# Patient Record
Sex: FEMALE | Race: WHITE | Employment: FULL TIME | URBAN - METROPOLITAN AREA
[De-identification: names, ages, dates, MRNs, and addresses within clinical notes are randomized per-mention and may not be internally consistent; named-entity substitution may affect disease eponyms.]

---

## 2017-01-11 DIAGNOSIS — E03.4 HYPOTHYROIDISM DUE TO ACQUIRED ATROPHY OF THYROID: ICD-10-CM

## 2017-01-12 ENCOUNTER — OFFICE VISIT (OUTPATIENT)
Dept: FAMILY MEDICINE CLINIC | Age: 47
End: 2017-01-12

## 2017-01-12 VITALS
RESPIRATION RATE: 20 BRPM | SYSTOLIC BLOOD PRESSURE: 128 MMHG | HEIGHT: 65 IN | WEIGHT: 231.6 LBS | DIASTOLIC BLOOD PRESSURE: 94 MMHG | TEMPERATURE: 97.5 F | OXYGEN SATURATION: 97 % | BODY MASS INDEX: 38.59 KG/M2 | HEART RATE: 92 BPM

## 2017-01-12 DIAGNOSIS — E78.00 PURE HYPERCHOLESTEROLEMIA: ICD-10-CM

## 2017-01-12 DIAGNOSIS — E03.4 HYPOTHYROIDISM DUE TO ACQUIRED ATROPHY OF THYROID: ICD-10-CM

## 2017-01-12 DIAGNOSIS — E55.9 HYPOVITAMINOSIS D: ICD-10-CM

## 2017-01-12 DIAGNOSIS — R07.89 OTHER CHEST PAIN: Primary | ICD-10-CM

## 2017-01-12 DIAGNOSIS — I10 ESSENTIAL HYPERTENSION: ICD-10-CM

## 2017-01-12 RX ORDER — LISINOPRIL AND HYDROCHLOROTHIAZIDE 20; 25 MG/1; MG/1
1 TABLET ORAL DAILY
Qty: 90 TAB | Refills: 1 | Status: SHIPPED | OUTPATIENT
Start: 2017-01-12 | End: 2017-08-21 | Stop reason: SDUPTHER

## 2017-01-12 NOTE — PROGRESS NOTES
Jerry Horne is a 48 y.o. female  Chief Complaint   Patient presents with    Cholesterol Problem     6 weeks follow up   1. Have you been to the ER, urgent care clinic since your last visit? Hospitalized since your last visit? No    2. Have you seen or consulted any other health care providers outside of the [facility] since your last visit? Include any pap smears or colon screening.  No

## 2017-01-12 NOTE — LETTER
NOTIFICATION RETURN TO WORK / SCHOOL 
 
1/12/2017 4:21 PM 
 
Ms. Servin [id number] [facility] [place] [address] To Whom It May Concern: 
 
Duc Shelby is currently under the care of [address] [place] [address]. She was seen in our office on Thursday 1/12/17. If there are questions or concerns please have the patient contact our office. Sincerely, Camille Villalta MD

## 2017-01-12 NOTE — MR AVS SNAPSHOT
Visit Information Date & Time Provider Department Dept. Phone Encounter #  
 1/12/2017  3:15 PM Connierobert aRfaelaMau [facility] [phone] [id number] Upcoming Health Maintenance Date Due  
 BREAST CANCER SCRN MAMMOGRAM 6/2/2018 DTaP/Tdap/Td series (2 - Td) 7/31/2025 Allergies as of 1/12/2017  Review Complete On: 1/12/2017 By: Tuan Russo MD  
 No Known Allergies Current Immunizations  Reviewed on 9/8/2016 Name Date Influenza Vaccine 10/15/2015 Influenza Vaccine (Quad) PF 9/8/2016 Influenza Vaccine Split 10/18/2012 Tdap 7/31/2015 Not reviewed this visit You Were Diagnosed With   
  
 Codes Comments Other chest pain    -  Primary ICD-10-CM: R07.89 ICD-9-CM: 786.59 Pure hypercholesterolemia     ICD-10-CM: E78.00 ICD-9-CM: 272.0 Essential hypertension     ICD-10-CM: I10 
ICD-9-CM: 401.9 Hypovitaminosis D     ICD-10-CM: E55.9 ICD-9-CM: 268.9 Hypothyroidism due to acquired atrophy of thyroid     ICD-10-CM: E03.4 ICD-9-CM: 244.8, 246.8 Vitals BP Pulse Temp Resp Height(growth percentile) Weight(growth percentile) (!) 128/94 (BP 1 Location: Right arm, BP Patient Position: Sitting) 92 97.5 °F (36.4 °C) (Oral) 20 5' 5\" (1.651 m) 231 lb 9.6 oz (105.1 kg) SpO2 BMI OB Status Smoking Status 97% 38.54 kg/m2 Hysterectomy Never Smoker BMI and BSA Data Body Mass Index Body Surface Area 38.54 kg/m 2 2.2 m 2 Preferred Pharmacy Pharmacy Name Phone [facility] [phone] [facility] [phone] Your Updated Medication List  
  
   
This list is accurate as of: 1/12/17  4:16 PM.  Always use your most recent med list.  
  
  
  
  
 esomeprazole 40 mg capsule Commonly known as:  NexIUM Take 1 Cap by mouth daily. levothyroxine 112 mcg tablet Commonly known as:  SYNTHROID  
 Take 1 Tab by mouth Daily (before breakfast). Indications: hypothyroidism  
  
 lisinopril-hydroCHLOROthiazide 20-25 mg per tablet Commonly known as:  Edilberto Lima Take 1 Tab by mouth daily. promethazine 25 mg tablet Commonly known as:  PHENERGAN Take 1 Tab by mouth every eight (8) hours as needed for Nausea. simvastatin 10 mg tablet Commonly known as:  ZOCOR Take 1 Tab by mouth nightly. VITAMIN D3 1,000 unit Cap Generic drug:  cholecalciferol Take  by mouth daily. Prescriptions Sent to Pharmacy Refills  
 lisinopril-hydroCHLOROthiazide (PRINZIDE, ZESTORETIC) 20-25 mg per tablet 1 Sig: Take 1 Tab by mouth daily. Class: Normal  
 Pharmacy: [address] [place] [address] #: [phone] Route: Oral  
  
We Performed the Following AMB POC EKG ROUTINE W/ 12 LEADS, INTER & REP [60622 CPT(R)] To-Do List   
 01/12/2017 Lab:  HEPATIC FUNCTION PANEL   
  
 01/12/2017 Lab:  LIPID PANEL   
  
 01/12/2017 ECG:  STRESS TEST CARDIAC   
  
 01/12/2017 Lab:  T4, FREE   
  
 01/12/2017 Lab:  TSH 3RD GENERATION   
  
 01/12/2017 Lab:  VITAMIN D, 25 HYDROXY Referral Information Referral ID Referred By Referred To  
  
 [id number] Chuck DOSHI Not Available Visits Status Start Date End Date 1 New Request 1/12/17 1/12/18 If your referral has a status of pending review or denied, additional information will be sent to support the outcome of this decision. Patient Instructions Chest Pain: Care Instructions Your Care Instructions There are many things that can cause chest pain. Some are not serious and will get better on their own in a few days. But some kinds of chest pain need more testing and treatment. Your doctor may have recommended a follow-up visit in the next 8 to 12 hours. If you are not getting better, you may need more tests or treatment. Even though your doctor has released you, you still need to watch for any problems. The doctor carefully checked you, but sometimes problems can develop later. If you have new symptoms or if your symptoms do not get better, get medical care right away. If you have worse or different chest pain or pressure that lasts more than 5 minutes or you passed out (lost consciousness), call 911 or seek other emergency help right away. A medical visit is only one step in your treatment. Even if you feel better, you still need to do what your doctor recommends, such as going to all suggested follow-up appointments and taking medicines exactly as directed. This will help you recover and help prevent future problems. How can you care for yourself at home? · Rest until you feel better. · Take your medicine exactly as prescribed. Call your doctor if you think you are having a problem with your medicine. · Do not drive after taking a prescription pain medicine. When should you call for help? Call 911 if: 
· You passed out (lost consciousness). · You have severe difficulty breathing. · You have symptoms of a heart attack. These may include: ¨ Chest pain or pressure, or a strange feeling in your chest. 
¨ Sweating. ¨ Shortness of breath. ¨ Nausea or vomiting. ¨ Pain, pressure, or a strange feeling in your back, neck, jaw, or upper belly or in one or both shoulders or arms. ¨ Lightheadedness or sudden weakness. ¨ A fast or irregular heartbeat. After you call 911, the  may tell you to chew 1 adult-strength or 2 to 4 low-dose aspirin. Wait for an ambulance. Do not try to drive yourself. Call your doctor today if: 
· You have any trouble breathing. · Your chest pain gets worse. · You are dizzy or lightheaded, or you feel like you may faint. · You are not getting better as expected. · You are having new or different chest pain. Where can you learn more? Go to http://yoel-osvaldo.info/. Enter A120 in the search box to learn more about \"Chest Pain: Care Instructions. \" Current as of: May 27, 2016 Content Version: 11.1 © 3477-7395 [employer]. Care instructions adapted under license by [employer] (which disclaims liability or warranty for this information). If you have questions about a medical condition or this instruction, always ask your healthcare professional. [address] any warranty or liability for your use of this information. Introducing [facility] & HEALTH SERVICES! Dear Delphia Habermann: Thank you for requesting a Medocity account. Our records indicate that you already have an active Medocity account. You can access your account anytime at [email or web address] Did you know that you can access your hospital and ER discharge instructions at any time in Medocity? You can also review all of your test results from your hospital stay or ER visit. Additional Information If you have questions, please visit the Frequently Asked Questions section of the Medocity website at [email or web address] Remember, Medocity is NOT to be used for urgent needs. For medical emergencies, dial 911. Now available from your iPhone and Android! Please provide this summary of care documentation to your next provider. Your primary care clinician is listed as Fredy Wallace. If you have any questions after today's visit, please call [phone].

## 2017-01-12 NOTE — PROGRESS NOTES
Assessment/Plan:    David De Leon was seen today for cholesterol problem. Diagnoses and all orders for this visit:    Other chest pain  -     AMB POC EKG ROUTINE W/ 12 LEADS, INTER & REP  -     STRESS TEST CARDIAC; Future    Pure hypercholesterolemia  -     LIPID PANEL; Future  -     HEPATIC FUNCTION PANEL; Future    Essential hypertension  -     lisinopril-hydroCHLOROthiazide (PRINZIDE, ZESTORETIC) 20-25 mg per tablet; Take 1 Tab by mouth daily. Hypovitaminosis D  -     VITAMIN D, 25 HYDROXY; Future    Hypothyroidism due to acquired atrophy of thyroid  -     TSH 3RD GENERATION; Future  -     T4, FREE; Future      Will await the stress test results. Will otherwise see her for f/u in 6 weeks for HTN. Will change med to [address] The plan was discussed with the patient. The patient verbalized understanding and is in agreement with the plan. All medication potential side effects were discussed with the patient.    -------------------------------------------------------------------------------------------------------------------        Calli Arriaga is a 48 y.o. female and presents with Cholesterol Problem (6 weeks follow up)         Subjective:  Pt here for f/u. Has been noting chest pain LT side, radiating into LT arm. Seems to occur at times of more stress. Has fam hx of her mother who had quadruple bypass in her late forties, so this worries pt. Pt has risk factors for CAD with the HTN and HLD.    HLD; we increased Zocor to 10 mg in Nov.  Tolerating it well. HTN: not so well controlled. ROS:  Constitutional: No recent weight change. No weakness/fatigue. No f/c. Skin: No rashes, change in nails/hair, itching   HENT: No HA, dizziness. No hearing loss/tinnitus. No nasal congestion/discharge. Eyes: No change in vision, double/blurred vision or eye pain/redness. Cardiovascular: No CP/palpitations. No GRANDE/orthopnea/PND. Respiratory: No cough/sputum, dyspnea, wheezing.    Gastointestinal: No dysphagia, reflux. No n/v. No constipation/diarrhea. No melena/rectal bleeding. Genitourinary: No dysuria, urinary hesitancy, nocturia, hematuria. No incontinence. Musculoskeletal: No joint pain/stiffness. No muscle pain/tenderness. Endo: No heat/cold intolerance, no polyuria/polydypsia. Heme: No h/o anemia. No easy bleeding/bruising. Allergy/Immunology: No seasonal rhinitis. Denies frequent colds, sinus/ear infections. Neurological: No seizures/numbness/weakness. No paresthesias. Psychiatric:  No depression, anxiety. The problem list was updated as a part of today's visit. Patient Active Problem List   Diagnosis Code    Hashimoto's thyroiditis E06.3    Hyperlipidemia E78.5    Gastroparesis K31.84    GERD (gastroesophageal reflux disease) K21.9    Fibromyalgia M79.7    Hiatal hernia K44.9    Hypothyroidism due to acquired atrophy of thyroid E03.4    Essential hypertension I10    Hypovitaminosis D E55.9       The PSH, FH were reviewed. SH:  Social History   Substance Use Topics    Smoking status: Never Smoker    Smokeless tobacco: Never Used    Alcohol use No       Medications/Allergies:  Current Outpatient Prescriptions on File Prior to Visit   Medication Sig Dispense Refill    simvastatin (ZOCOR) 10 mg tablet Take 1 Tab by mouth nightly. 90 Tab 1    levothyroxine (SYNTHROID) 112 mcg tablet Take 1 Tab by mouth Daily (before breakfast). Indications: hypothyroidism 90 Tab 1    cholecalciferol (VITAMIN D3) 1,000 unit cap Take  by mouth daily.  esomeprazole (NEXIUM) 40 mg capsule Take 1 Cap by mouth daily. 90 Cap 0    promethazine (PHENERGAN) 25 mg tablet Take 1 Tab by mouth every eight (8) hours as needed for Nausea. 60 Tab 0     No current facility-administered medications on file prior to visit.          No Known Allergies      Health Maintenance:   Health Maintenance   Topic Date Due    BREAST CANCER SCRN MAMMOGRAM  06/02/2018    DTaP/Tdap/Td series (2 - Td) 07/31/2025    INFLUENZA AGE 9 TO ADULT  Completed       Objective:  Visit Vitals    BP (!) 128/94 (BP 1 Location: Right arm, BP Patient Position: Sitting)    Pulse 92    Temp 97.5 °F (36.4 °C) (Oral)    Resp 20    Ht 5' 5\" (1.651 m)    Wt 231 lb 9.6 oz (105.1 kg)    SpO2 97%    BMI 38.54 kg/m2          Nurses notes and VS reviewed. Physical Examination: General appearance - alert, well appearing, and in no distress  Chest - clear to auscultation, no wheezes, rales or rhonchi, symmetric air entry  Heart - normal rate, regular rhythm, normal S1, S2, no murmurs, rubs, clicks or gallops        Labwork and Ancillary Studies:    CBC w/Diff  Lab Results   Component Value Date/Time    WBC 9.1 09/08/2016 08:18 AM    HGB 14.6 09/08/2016 08:18 AM    PLATELET 533 17/17/7152 08:18 AM         Basic Metabolic Profile  Lab Results   Component Value Date/Time    Sodium 142 09/08/2016 08:18 AM    Potassium 4.7 09/08/2016 08:18 AM    Chloride 99 09/08/2016 08:18 AM    CO2 27 09/08/2016 08:18 AM    Glucose 104 09/08/2016 08:18 AM    BUN 11 09/08/2016 08:18 AM    Creatinine 0.97 09/08/2016 08:18 AM    BUN/Creatinine ratio 11 09/08/2016 08:18 AM    GFR est AA 80 09/08/2016 08:18 AM    GFR est non-AA 69 09/08/2016 08:18 AM    Calcium 9.7 09/08/2016 08:18 AM         LFT  Lab Results   Component Value Date/Time    ALT 25 09/08/2016 08:18 AM    AST 21 09/08/2016 08:18 AM    Alk.  phosphatase 98 09/08/2016 08:18 AM    Bilirubin, direct 0.05 09/08/2016 08:18 AM    Bilirubin, total 0.2 09/08/2016 08:18 AM         Cholesterol  Lab Results   Component Value Date/Time    Cholesterol, total 205 11/09/2016 08:57 AM    HDL Cholesterol 54 11/09/2016 08:57 AM    LDL, calculated 128 11/09/2016 08:57 AM    Triglyceride 115 11/09/2016 08:57 AM

## 2017-02-03 ENCOUNTER — TELEPHONE (OUTPATIENT)
Dept: FAMILY MEDICINE CLINIC | Age: 47
End: 2017-02-03

## 2017-02-22 DIAGNOSIS — R07.89 OTHER CHEST PAIN: ICD-10-CM

## 2017-02-28 ENCOUNTER — TELEPHONE (OUTPATIENT)
Dept: FAMILY MEDICINE CLINIC | Age: 47
End: 2017-02-28

## 2017-02-28 NOTE — TELEPHONE ENCOUNTER
Pt called; she has not been taking the Zocor medication bc it was making her legs cramp up and made her feel \"yucky\"; pt was clocking in at work and had to hang up phone; please advise

## 2017-02-28 NOTE — TELEPHONE ENCOUNTER
We will try something to see if she can tolerate the Zocor. I want her to get CoQ10 100 - 150 mg dose and take once daily for a week. Then add the Zocor 10 mg back and take the 2 of them together at bedtime. Then monitor the cramping issue to see it it returns. It is important that she follow the above exactly this way. She has an appt in 1 month. If it goes well, then we will discuss it further on her visit.

## 2017-03-02 ENCOUNTER — OFFICE VISIT (OUTPATIENT)
Dept: FAMILY MEDICINE CLINIC | Age: 47
End: 2017-03-02

## 2017-03-02 VITALS
TEMPERATURE: 98.1 F | OXYGEN SATURATION: 96 % | WEIGHT: 230 LBS | BODY MASS INDEX: 38.32 KG/M2 | RESPIRATION RATE: 20 BRPM | DIASTOLIC BLOOD PRESSURE: 80 MMHG | SYSTOLIC BLOOD PRESSURE: 124 MMHG | HEART RATE: 75 BPM | HEIGHT: 65 IN

## 2017-03-02 DIAGNOSIS — R10.11 RUQ PAIN: ICD-10-CM

## 2017-03-02 DIAGNOSIS — R10.84 GENERALIZED ABDOMINAL PAIN: Primary | ICD-10-CM

## 2017-03-02 DIAGNOSIS — K80.20 CALCULUS OF GALLBLADDER WITHOUT CHOLECYSTITIS WITHOUT OBSTRUCTION: ICD-10-CM

## 2017-03-02 DIAGNOSIS — K59.01 SLOW TRANSIT CONSTIPATION: ICD-10-CM

## 2017-03-02 RX ORDER — HYDROCODONE BITARTRATE AND ACETAMINOPHEN 5; 325 MG/1; MG/1
1 TABLET ORAL
Qty: 30 TAB | Refills: 0 | Status: SHIPPED | OUTPATIENT
Start: 2017-03-02 | End: 2017-03-20

## 2017-03-02 NOTE — PROGRESS NOTES
Chief Complaint   Patient presents with    Abdominal Pain     pain scale 4/10    Diarrhea     1. Have you been to the ER, urgent care clinic since your last visit? Hospitalized since your last visit? No    2. Have you seen or consulted any other health care providers outside of the [address] [place] [address] since your last visit? Include any pap smears or colon screening.  No

## 2017-03-02 NOTE — MR AVS SNAPSHOT
Visit Information Date & Time Provider Department Dept. Phone Encounter #  
 3/2/2017  9:00 AM Robbie Metzger, 3 [facility] [phone] [id number] Your Appointments 3/23/2017 10:30 AM  
Follow Up with Robbie Metzger MD  
3 [facility]) Appt Note: 6 week f/u; pt r/s from 02/23/2017; r/s from 3/01  
 [address] [facility] [address] [facility] [id number] [phone]  
  
   
 [address] [place] [address] Upcoming Health Maintenance Date Due  
 BREAST CANCER SCRN MAMMOGRAM 6/2/2018 DTaP/Tdap/Td series (2 - Td) 7/31/2025 Allergies as of 3/2/2017  Review Complete On: 3/2/2017 By: Robbie Metzger MD  
 No Known Allergies Current Immunizations  Reviewed on 9/8/2016 Name Date Influenza Vaccine 10/15/2015 Influenza Vaccine (Quad) PF 9/8/2016 Influenza Vaccine Split 10/18/2012 Tdap 7/31/2015 Not reviewed this visit You Were Diagnosed With   
  
 Codes Comments Generalized abdominal pain    -  Primary ICD-10-CM: R10.84 ICD-9-CM: 789.07 Slow transit constipation     ICD-10-CM: K59.01 
ICD-9-CM: 564.01 Vitals BP  
  
  
  
  
  
 124/80 (BP 1 Location: Left arm, BP Patient Position: Sitting) Vitals History BMI and BSA Data Body Mass Index Body Surface Area  
 38.27 kg/m 2 2.19 m 2 Preferred Pharmacy Pharmacy Name Phone [facility] [address] [facility] [phone] Your Updated Medication List  
  
   
This list is accurate as of: 3/2/17  9:36 AM.  Always use your most recent med list.  
  
  
  
  
 esomeprazole 40 mg capsule Commonly known as:  NexIUM Take 1 Cap by mouth daily. HYDROcodone-acetaminophen 5-325 mg per tablet Commonly known as:  Becky Azar Take 1 Tab by mouth two (2) times daily as needed for Pain. Max Daily Amount: 2 Tabs. levothyroxine 112 mcg tablet Commonly known as:  SYNTHROID Take 1 Tab by mouth Daily (before breakfast). Indications: hypothyroidism  
  
 lisinopril-hydroCHLOROthiazide 20-25 mg per tablet Commonly known as:  Helyn Blinks Take 1 Tab by mouth daily. promethazine 25 mg tablet Commonly known as:  PHENERGAN Take 1 Tab by mouth every eight (8) hours as needed for Nausea. simvastatin 10 mg tablet Commonly known as:  ZOCOR Take 1 Tab by mouth nightly. VITAMIN D3 1,000 unit Cap Generic drug:  cholecalciferol Take  by mouth daily. Prescriptions Printed Refills HYDROcodone-acetaminophen (NORCO) 5-325 mg per tablet 0 Sig: Take 1 Tab by mouth two (2) times daily as needed for Pain. Max Daily Amount: 2 Tabs. Class: Print Route: Oral  
  
To-Do List   
 03/02/2017 Imaging:  XR ABD (AP AND ERECT OR DECUB) Patient Instructions Constipation: Care Instructions Your Care Instructions Constipation means that you have a hard time passing stools (bowel movements). People pass stools from 3 times a day to once every 3 days. What is normal for you may be different. Constipation may occur with pain in the rectum and cramping. The pain may get worse when you try to pass stools. Sometimes there are small amounts of bright red blood on toilet paper or the surface of stools. This is because of enlarged veins near the rectum (hemorrhoids). A few changes in your diet and lifestyle may help you avoid ongoing constipation. Your doctor may also prescribe medicine to help loosen your stool. Some medicines can cause constipation. These include pain medicines and antidepressants. Tell your doctor about all the medicines you take. Your doctor may want to make a medicine change to ease your symptoms. Follow-up care is a key part of your treatment and safety.  Be sure to make and go to all appointments, and call your doctor if you are having problems. It's also a good idea to know your test results and keep a list of the medicines you take. How can you care for yourself at home? · Drink plenty of fluids, enough so that your urine is light yellow or clear like water. If you have kidney, heart, or liver disease and have to limit fluids, talk with your doctor before you increase the amount of fluids you drink. · Include high-fiber foods in your diet each day. These include fruits, vegetables, beans, and whole grains. · Get at least 30 minutes of exercise on most days of the week. Walking is a good choice. You also may want to do other activities, such as running, swimming, cycling, or playing tennis or team sports. · Take a fiber supplement, such as Citrucel or Metamucil, every day. Read and follow all instructions on the label. · Schedule time each day for a bowel movement. A daily routine may help. Take your time having your bowel movement. · Support your feet with a small step stool when you sit on the toilet. This helps flex your hips and places your pelvis in a squatting position. · Your doctor may recommend an over-the-counter laxative to relieve your constipation. Examples are Milk of Magnesia and MiraLax. Read and follow all instructions on the label. Do not use laxatives on a long-term basis. When should you call for help? Call your doctor now or seek immediate medical care if: 
· You have new or worse belly pain. · You have new or worse nausea or vomiting. · You have blood in your stools. Watch closely for changes in your health, and be sure to contact your doctor if: 
· Your constipation is getting worse. · You do not get better as expected. Where can you learn more? Go to http://yoel-osvaldo.info/. Enter 21  in the search box to learn more about \"Constipation: Care Instructions. \" Current as of: May 27, 2016 Content Version: 11.1 © 1301-7476 [employer].  Care instructions adapted under license by [address] (which disclaims liability or warranty for this information). If you have questions about a medical condition or this instruction, always ask your healthcare professional. [address] any warranty or liability for your use of this information. Introducing [facility] HE[facility] SERVICES! Dear Sean Franz: Thank you for requesting a WiNetworks account. Our records indicate that you already have an active WiNetworks account. You can access your account anytime at [email or web address] Did you know that you can access your hospital and ER discharge instructions at any time in WiNetworks? You can also review all of your test results from your hospital stay or ER visit. Additional Information If you have questions, please visit the Frequently Asked Questions section of the WiNetworks website at [email or web address] Remember, WiNetworks is NOT to be used for urgent needs. For medical emergencies, dial 911. Now available from your iPhone and Android! Please provide this summary of care documentation to your next provider. Your primary care clinician is listed as Fredy 51. If you have any questions after today's visit, please call [phone].

## 2017-03-02 NOTE — LETTER
NOTIFICATION RETURN TO WORK / SCHOOL 
 
3/2/2017 9:41 AM 
 
Ms. [address] 
[address] [phone] To Whom It May Concern: 
 
Michael Polk is currently under the care of [address] [place] [address]. She was seen on 3/2/2017. If there are questions or concerns please have the patient contact our office. Sincerely, Black Aldridge MD

## 2017-03-02 NOTE — PROGRESS NOTES
Assessment/Plan:    Leidy Stanley was seen today for abdominal pain and diarrhea. Diagnoses and all orders for this visit:    Generalized abdominal pain  -     XR ABD (AP AND ERECT OR DECUB); Future  -     HYDROcodone-acetaminophen (NORCO) 5-325 mg per tablet; Take 1 Tab by mouth two (2) times daily as needed for Pain. Max Daily Amount: 2 Tabs. Slow transit constipation  -     XR ABD (AP AND ERECT OR DECUB); Future      Will await results. Cautioned her on Norco and how it can constipate her and make her drowsy. She will use 1/2 tab if possible. Will consider lactulose if still retaining much stool. The plan was discussed with the patient. The patient verbalized understanding and is in agreement with the plan. All medication potential side effects were discussed with the patient.    -------------------------------------------------------------------------------------------------------------------        Hoang Savage is a 48 y.o. female and presents with Abdominal Pain (pain scale 4/10) and Diarrhea         Subjective:  About 1 week ago, started with constipation. Was pushing fluids x 3 days with no results. Then tried a laxative but had mild results. Pain and pressure has been intense. Used laxative 3 days in a row, then had a reverse reaction of diarrhea yesterday into today. No fevers. ROS:  Constitutional: No recent weight change. No weakness/fatigue. No f/c. Skin: No rashes, change in nails/hair, itching   HENT: No HA, dizziness. No hearing loss/tinnitus. No nasal congestion/discharge. Eyes: No change in vision, double/blurred vision or eye pain/redness. Cardiovascular: No CP/palpitations. No GRANDE/orthopnea/PND. Respiratory: No cough/sputum, dyspnea, wheezing. Gastointestinal: No dysphagia, reflux. No n/v. No constipation/diarrhea. No melena/rectal bleeding. Genitourinary: No dysuria, urinary hesitancy, nocturia, hematuria. No incontinence.    Musculoskeletal: No joint pain/stiffness. No muscle pain/tenderness. Endo: No heat/cold intolerance, no polyuria/polydypsia. Heme: No h/o anemia. No easy bleeding/bruising. Allergy/Immunology: No seasonal rhinitis. Denies frequent colds, sinus/ear infections. Neurological: No seizures/numbness/weakness. No paresthesias. Psychiatric:  No depression, anxiety. The problem list was updated as a part of today's visit. Patient Active Problem List   Diagnosis Code    Hashimoto's thyroiditis E06.3    Hyperlipidemia E78.5    Gastroparesis K31.84    GERD (gastroesophageal reflux disease) K21.9    Fibromyalgia M79.7    Hiatal hernia K44.9    Hypothyroidism due to acquired atrophy of thyroid E03.4    Essential hypertension I10    Hypovitaminosis D E55.9       The PSH, FH were reviewed. SH:  Social History   Substance Use Topics    Smoking status: Never Smoker    Smokeless tobacco: Never Used    Alcohol use No       Medications/Allergies:  Current Outpatient Prescriptions on File Prior to Visit   Medication Sig Dispense Refill    lisinopril-hydroCHLOROthiazide (PRINZIDE, ZESTORETIC) 20-25 mg per tablet Take 1 Tab by mouth daily. 90 Tab 1    simvastatin (ZOCOR) 10 mg tablet Take 1 Tab by mouth nightly. 90 Tab 1    levothyroxine (SYNTHROID) 112 mcg tablet Take 1 Tab by mouth Daily (before breakfast). Indications: hypothyroidism 90 Tab 1    cholecalciferol (VITAMIN D3) 1,000 unit cap Take  by mouth daily.  esomeprazole (NEXIUM) 40 mg capsule Take 1 Cap by mouth daily. 90 Cap 0    promethazine (PHENERGAN) 25 mg tablet Take 1 Tab by mouth every eight (8) hours as needed for Nausea. 60 Tab 0     No current facility-administered medications on file prior to visit.          No Known Allergies      Health Maintenance:   Health Maintenance   Topic Date Due    BREAST CANCER SCRN MAMMOGRAM  06/02/2018    DTaP/Tdap/Td series (2 - Td) 07/31/2025    INFLUENZA AGE 9 TO ADULT  Completed       Objective:  Visit Vitals    /80 (BP 1 Location: Left arm, BP Patient Position: Sitting)    Pulse 75    Temp 98.1 °F (36.7 °C) (Oral)    Resp 20    Ht 5' 5\" (1.651 m)    Wt 230 lb (104.3 kg)    SpO2 96%    BMI 38.27 kg/m2          Nurses notes and VS reviewed. Physical Examination: General appearance - looks uncomfortable. Chest - clear to auscultation, no wheezes, rales or rhonchi, symmetric air entry  Heart - normal rate, regular rhythm, normal S1, S2, no murmurs, rubs, clicks or gallops  Abdomen - tenderness noted generalized. Labwork and Ancillary Studies:    CBC w/Diff  Lab Results   Component Value Date/Time    WBC 9.1 09/08/2016 08:18 AM    HGB 14.6 09/08/2016 08:18 AM    PLATELET 721 32/14/0183 08:18 AM         Basic Metabolic Profile  Lab Results   Component Value Date/Time    Sodium 142 09/08/2016 08:18 AM    Potassium 4.7 09/08/2016 08:18 AM    Chloride 99 09/08/2016 08:18 AM    CO2 27 09/08/2016 08:18 AM    Glucose 104 09/08/2016 08:18 AM    BUN 11 09/08/2016 08:18 AM    Creatinine 0.97 09/08/2016 08:18 AM    BUN/Creatinine ratio 11 09/08/2016 08:18 AM    GFR est AA 80 09/08/2016 08:18 AM    GFR est non-AA 69 09/08/2016 08:18 AM    Calcium 9.7 09/08/2016 08:18 AM         LFT  Lab Results   Component Value Date/Time    ALT (SGPT) 25 09/08/2016 08:18 AM    AST (SGOT) 21 09/08/2016 08:18 AM    Alk.  phosphatase 98 09/08/2016 08:18 AM    Bilirubin, direct 0.05 09/08/2016 08:18 AM    Bilirubin, total 0.2 09/08/2016 08:18 AM         Cholesterol  Lab Results   Component Value Date/Time    Cholesterol, total 205 11/09/2016 08:57 AM    HDL Cholesterol 54 11/09/2016 08:57 AM    LDL, calculated 128 11/09/2016 08:57 AM    Triglyceride 115 11/09/2016 08:57 AM

## 2017-03-02 NOTE — PATIENT INSTRUCTIONS
Constipation: Care Instructions  Your Care Instructions  Constipation means that you have a hard time passing stools (bowel movements). People pass stools from 3 times a day to once every 3 days. What is normal for you may be different. Constipation may occur with pain in the rectum and cramping. The pain may get worse when you try to pass stools. Sometimes there are small amounts of bright red blood on toilet paper or the surface of stools. This is because of enlarged veins near the rectum (hemorrhoids). A few changes in your diet and lifestyle may help you avoid ongoing constipation. Your doctor may also prescribe medicine to help loosen your stool. Some medicines can cause constipation. These include pain medicines and antidepressants. Tell your doctor about all the medicines you take. Your doctor may want to make a medicine change to ease your symptoms. Follow-up care is a key part of your treatment and safety. Be sure to make and go to all appointments, and call your doctor if you are having problems. It's also a good idea to know your test results and keep a list of the medicines you take. How can you care for yourself at home? · Drink plenty of fluids, enough so that your urine is light yellow or clear like water. If you have kidney, heart, or liver disease and have to limit fluids, talk with your doctor before you increase the amount of fluids you drink. · Include high-fiber foods in your diet each day. These include fruits, vegetables, beans, and whole grains. · Get at least 30 minutes of exercise on most days of the week. Walking is a good choice. You also may want to do other activities, such as running, swimming, cycling, or playing tennis or team sports. · Take a fiber supplement, such as Citrucel or Metamucil, every day. Read and follow all instructions on the label. · Schedule time each day for a bowel movement. A daily routine may help.  Take your time having your bowel movement. · Support your feet with a small step stool when you sit on the toilet. This helps flex your hips and places your pelvis in a squatting position. · Your doctor may recommend an over-the-counter laxative to relieve your constipation. Examples are Milk of Magnesia and MiraLax. Read and follow all instructions on the label. Do not use laxatives on a long-term basis. When should you call for help? Call your doctor now or seek immediate medical care if:  · You have new or worse belly pain. · You have new or worse nausea or vomiting. · You have blood in your stools. Watch closely for changes in your health, and be sure to contact your doctor if:  · Your constipation is getting worse. · You do not get better as expected. Where can you learn more? Go to http://yoel-osvaldo.info/. Enter 21 [phone] in the search box to learn more about \"Constipation: Care Instructions. \"  Current as of: May 27, 2016  Content Version: 11.1  © 4040-4934 [employer]. Care instructions adapted under license by [employer] (which disclaims liability or warranty for this information). If you have questions about a medical condition or this instruction, always ask your healthcare professional. [address] any warranty or liability for your use of this information.

## 2017-03-02 NOTE — LETTER
3/2/2017 9:40 AM 
 
Ms. [address] 
[address] [facility] [place] [facility] [id number] To Whom It May Concern: 
 
Michael Polk is currently under the care of [address] [place] [address]. She was seen on 3/2/2017. If there are questions or concerns please have the patient contact our office. Sincerely, Artem Vidales MD

## 2017-03-03 ENCOUNTER — TELEPHONE (OUTPATIENT)
Dept: FAMILY MEDICINE CLINIC | Age: 47
End: 2017-03-03

## 2017-03-03 NOTE — TELEPHONE ENCOUNTER
Pt called to inform us she faxed over a copy of her x-ray results. Although everything is normal she's in excruciating pain, nothing had changed and nothing is helping. If she tries to eat/dring and she doubles over in pain. Pt is currently at work which is also listed on the encounter.

## 2017-03-07 ENCOUNTER — TELEPHONE (OUTPATIENT)
Dept: FAMILY MEDICINE CLINIC | Age: 47
End: 2017-03-07

## 2017-03-07 NOTE — TELEPHONE ENCOUNTER
Patient has been seen in [place] and they have suggested patient to have gallbladder removed. Please generate referral for gastro please. Patient has called Ayo's office but no appt until end of the month.  Patient would like to try different MD.

## 2017-03-07 NOTE — TELEPHONE ENCOUNTER
She really should be seeing a surgeon if the plan is for removing the gallbladder. A GI is not going to manage that because they do not do surgery. Is she ready to proceed with surgery? I will print the referral.  Give her Dr. Katerine Pettit' number to call and make an appt.

## 2017-03-09 ENCOUNTER — TELEPHONE (OUTPATIENT)
Dept: SURGERY | Age: 47
End: 2017-03-09

## 2017-03-09 NOTE — TELEPHONE ENCOUNTER
LM on  to schedule appointment with Dr. Rashel Machado (on call doctor) or first available per rapid referral fax from Dr. Angel French

## 2017-03-10 ENCOUNTER — OFFICE VISIT (OUTPATIENT)
Dept: SURGERY | Age: 47
End: 2017-03-10

## 2017-03-10 VITALS
SYSTOLIC BLOOD PRESSURE: 99 MMHG | TEMPERATURE: 95.7 F | WEIGHT: 227 LBS | DIASTOLIC BLOOD PRESSURE: 60 MMHG | BODY MASS INDEX: 37.82 KG/M2 | RESPIRATION RATE: 20 BRPM | HEIGHT: 65 IN | HEART RATE: 90 BPM

## 2017-03-10 DIAGNOSIS — K81.1 CHRONIC CHOLECYSTITIS: Primary | ICD-10-CM

## 2017-03-10 RX ORDER — HYDROCHLOROTHIAZIDE 25 MG/1
TABLET ORAL
COMMUNITY
Start: 2017-01-11 | End: 2017-03-20

## 2017-03-10 RX ORDER — TRAMADOL HYDROCHLORIDE 50 MG/1
50 TABLET ORAL
COMMUNITY
Start: 2015-06-18 | End: 2017-07-06 | Stop reason: ALTCHOICE

## 2017-03-10 RX ORDER — IBUPROFEN 800 MG/1
800 TABLET ORAL
COMMUNITY
Start: 2015-06-18

## 2017-03-10 RX ORDER — DOCUSATE SODIUM 100 MG/1
100 CAPSULE, LIQUID FILLED ORAL
COMMUNITY
Start: 2015-06-18 | End: 2017-03-20

## 2017-03-10 RX ORDER — HYDROCODONE BITARTRATE AND ACETAMINOPHEN 5; 325 MG/1; MG/1
TABLET ORAL
COMMUNITY
Start: 2017-03-05 | End: 2017-03-20

## 2017-03-10 RX ORDER — DOCUSATE SODIUM 100 MG/1
CAPSULE, LIQUID FILLED ORAL
COMMUNITY
End: 2017-07-06 | Stop reason: ALTCHOICE

## 2017-03-10 RX ORDER — HYDROGEN PEROXIDE 3 %
20 SOLUTION, NON-ORAL MISCELLANEOUS
COMMUNITY
End: 2017-03-20

## 2017-03-10 RX ORDER — ONDANSETRON 4 MG/1
4 TABLET, ORALLY DISINTEGRATING ORAL
COMMUNITY
Start: 2017-03-05 | End: 2017-07-06 | Stop reason: ALTCHOICE

## 2017-03-10 RX ORDER — LEVOTHYROXINE SODIUM 112 UG/1
112 TABLET ORAL
COMMUNITY
End: 2017-03-20

## 2017-03-10 RX ORDER — SIMVASTATIN 5 MG/1
TABLET, FILM COATED ORAL
COMMUNITY
Start: 2016-12-08 | End: 2017-03-20

## 2017-03-10 RX ORDER — DICYCLOMINE HYDROCHLORIDE 20 MG/1
20 TABLET ORAL
Status: ON HOLD | COMMUNITY
Start: 2016-10-13 | End: 2017-03-27

## 2017-03-10 NOTE — PROGRESS NOTES
Surgery Consultation    Subjective:     Domingo Warren is a 48 y.o. female with a history of abdominal pain. She complains of right upper quadrant pain, typically associated  with fatty foods. She has had nausea and no vomiting and bloating. The symptoms began in October 2016 and then worsened this past week. The patient  has not had jaundice, acholic stools or dark urine and has not had a history of pancreatitis or hepatitis. Patient Active Problem List    Diagnosis Date Noted    Hypovitaminosis D 01/12/2017    Essential hypertension 11/09/2016    Hypothyroidism due to acquired atrophy of thyroid 09/08/2016    Hiatal hernia 03/28/2016    Fibromyalgia 10/21/2015    Gastroparesis 10/18/2012    GERD (gastroesophageal reflux disease) 10/18/2012    Hashimoto's thyroiditis 03/20/2012    Hyperlipidemia 03/20/2012     Past Medical History:   Diagnosis Date    Endometriosis     Essential hypertension 11/9/2016    Fibromyalgia 10/21/2015         Gastroparesis 10/18/2012    GERD (gastroesophageal reflux disease) 10/18/2012    Hashimoto's thyroiditis 3/20/2012    Hiatal hernia 3/28/2016    Hyperlipidemia 3/20/2012    Hypothyroidism 3/20/2012    Hypothyroidism due to acquired atrophy of thyroid 9/8/2016    Hypovitaminosis D 1/12/2017      Past Surgical History:   Procedure Laterality Date    HX GYN  7/2004    partial hysterectomy for endometriosis, has both ovaries.  HX KNEE ARTHROSCOPY  2/2015    Dr. Ambar Nur    HX OVARIAN CYST REMOVAL  6/2015.     VAG HYST,RMV TUBE/OVARY  6-2015    Dr. Jones Daughters      Social History   Substance Use Topics    Smoking status: Never Smoker    Smokeless tobacco: Never Used    Alcohol use No      Family History   Problem Relation Age of Onset    Diabetes Mother     Thyroid Disease Mother     Arthritis-osteo Mother     Elevated Lipids Mother     Diabetes Maternal Aunt     Diabetes Maternal Grandfather       Current Outpatient Prescriptions   Medication Sig    dicyclomine (BENTYL) 20 mg tablet 20 mg.    docusate sodium (COLACE) 100 mg capsule 100 mg.  ibuprofen (MOTRIN) 800 mg tablet 800 mg.    ondansetron (ZOFRAN ODT) 4 mg disintegrating tablet 4 mg.  traMADol (ULTRAM) 50 mg tablet 50 mg.    HYDROcodone-acetaminophen (NORCO) 5-325 mg per tablet Take 1 Tab by Mouth Every 4 Hours As Needed for Pain.  docusate sodium (COLACE) 100 mg capsule Take  by Mouth.  esomeprazole (NEXIUM) 20 mg capsule 20 mg.    levothyroxine (SYNTHROID) 112 mcg tablet 112 mcg.  hydroCHLOROthiazide (HYDRODIURIL) 25 mg tablet     simvastatin (ZOCOR) 5 mg tablet     HYDROcodone-acetaminophen (NORCO) 5-325 mg per tablet Take 1 Tab by mouth two (2) times daily as needed for Pain. Max Daily Amount: 2 Tabs.  lisinopril-hydroCHLOROthiazide (PRINZIDE, ZESTORETIC) 20-25 mg per tablet Take 1 Tab by mouth daily.  simvastatin (ZOCOR) 10 mg tablet Take 1 Tab by mouth nightly.  levothyroxine (SYNTHROID) 112 mcg tablet Take 1 Tab by mouth Daily (before breakfast). Indications: hypothyroidism    cholecalciferol (VITAMIN D3) 1,000 unit cap Take  by mouth daily.  esomeprazole (NEXIUM) 40 mg capsule Take 1 Cap by mouth daily.  promethazine (PHENERGAN) 25 mg tablet Take 1 Tab by mouth every eight (8) hours as needed for Nausea. No current facility-administered medications for this visit.        No Known Allergies     Review of Systems:  Positive in BOLD    CONST: Fever, weight loss, fatigue or chills  GI: Nausea, vomiting, abdominal pain, change in bowel habits, hematochezia, melena, and GERD   INTEG: Dermatitis, abnormal moles  HEENT: Recent changes in vision, vertigo, epistaxis, dysphagia and hoarseness  CV: Chest pain, palpitations, HTN, edema and varicosities  RESP: Cough, shortness of breath, wheezing, hemoptysis, snoring and reactive airway disease  : Hematuria, dysuria, frequency, urgency, nocturia and stress urinary incontinence   MS: Weakness, joint pain and arthritis  ENDO: Diabetes, thyroid disease, polyuria, polydipsia, polyphagia, poor wound healing, heat intolerance, cold intolerance  LYMPH/HEME: Anemia, bruising and history of blood transfusions  NEURO: Dizziness, headache, fainting, seizures and stroke  PSYCH: Anxiety and depression      Objective:     Visit Vitals    BP 99/60 (BP 1 Location: Left arm, BP Patient Position: At rest)    Pulse 90    Temp 95.7 °F (35.4 °C) (Oral)    Resp 20    Ht 5' 5\" (1.651 m)    Wt 103 kg (227 lb)    BMI 37.77 kg/m2       Physical Exam:      GENERAL: alert, cooperative, no distress, appears stated age  EYE:conjunctivae and sclerae normal, pupils equal, round, reactive to light, extraocular movements intact without nystagmus  THROAT & NECK: no erythema or exudates noted and neck supple and symmetrical; no palpable masses  LUNG: clear to auscultation bilaterally  HEART: Regular rate and rhythm  ABDOMEN:abdomen is soft without significant tenderness, masses, organomegaly or guarding  EXTREMITIES:  extremities normal, atraumatic, no cyanosis or edema  SKIN: Normal.    Imaging and Lab Review:   Findings of ultrasound of the abdomen: gallstones and thickened gallbladder wall. No results found for this or any previous visit (from the past 24 hour(s)). images and reports reviewed    Assessment:   Chronic cholecystitis. Patient has significant symptoms and wishes to proceed with surgical intervention. Plan:   I explained the indications for laparoscopic cholecystectomy as well as the alternatives. I discussed the potential risks, benefits, and likely outcomes of this course of care, including but not limited to bleeding, wound infection, need for reoperation, bile leak, hernia formation, trocar injuries to small bowel and other structures. We discussed also the possible need for conversion to open procedure as well as a less than one percent risk of common bile duct injury.   We have also discussed the indications for cholangiogram including identification of the cystic duct, verification of normal ductal anatomy and evaluating choledocholithiasis. We have also discussed that she may require an ERCP if there are retained common bile duct stones which may necessitate and additional drain placement and hospital stay. She indicates that she understands the risks, accepts and wishes to proceed. She indicates that she understands the risks, accepts and wishes to proceed.  She understands the constipation and bloating will most likely not improve with cholecystectomy      Signed By: Himanshu Fierro MD     March 10, 2017

## 2017-03-10 NOTE — PROGRESS NOTES
Jovita Garcia is a 48 y.o. female who presents today with   Chief Complaint   Patient presents with    Abdominal Pain     Pt C/o RUQ. Pt had ABD US done on 3/5/2017 and is here for surgical intervention. 1. Have you been to the ER, urgent care clinic since your last visit? Hospitalized since your last visit? No    2. Have you seen or consulted any other health care providers outside of the [address] [place] [address] since your last visit? Include any pap smears or colon screening.  No

## 2017-03-10 NOTE — COMMUNICATION BODY
Surgery Consultation    Subjective:     Jagdish Estrada is a 48 y.o. female with a history of abdominal pain. She complains of right upper quadrant pain, typically associated  with fatty foods. She has had nausea and no vomiting and bloating. The symptoms began in October 2016 and then worsened this past week. The patient  has not had jaundice, acholic stools or dark urine and has not had a history of pancreatitis or hepatitis. Patient Active Problem List    Diagnosis Date Noted    Hypovitaminosis D 01/12/2017    Essential hypertension 11/09/2016    Hypothyroidism due to acquired atrophy of thyroid 09/08/2016    Hiatal hernia 03/28/2016    Fibromyalgia 10/21/2015    Gastroparesis 10/18/2012    GERD (gastroesophageal reflux disease) 10/18/2012    Hashimoto's thyroiditis 03/20/2012    Hyperlipidemia 03/20/2012     Past Medical History:   Diagnosis Date    Endometriosis     Essential hypertension 11/9/2016    Fibromyalgia 10/21/2015         Gastroparesis 10/18/2012    GERD (gastroesophageal reflux disease) 10/18/2012    Hashimoto's thyroiditis 3/20/2012    Hiatal hernia 3/28/2016    Hyperlipidemia 3/20/2012    Hypothyroidism 3/20/2012    Hypothyroidism due to acquired atrophy of thyroid 9/8/2016    Hypovitaminosis D 1/12/2017      Past Surgical History:   Procedure Laterality Date    HX GYN  7/2004    partial hysterectomy for endometriosis, has both ovaries.  HX KNEE ARTHROSCOPY  2/2015    Dr. Olsen Patient    HX OVARIAN CYST REMOVAL  6/2015.     VAG HYST,RMV TUBE/OVARY  6-2015    Dr. Hernesto Haq      Social History   Substance Use Topics    Smoking status: Never Smoker    Smokeless tobacco: Never Used    Alcohol use No      Family History   Problem Relation Age of Onset    Diabetes Mother     Thyroid Disease Mother     Arthritis-osteo Mother     Elevated Lipids Mother     Diabetes Maternal Aunt     Diabetes Maternal Grandfather       Current Outpatient Prescriptions   Medication Sig    dicyclomine (BENTYL) 20 mg tablet 20 mg.    docusate sodium (COLACE) 100 mg capsule 100 mg.  ibuprofen (MOTRIN) 800 mg tablet 800 mg.    ondansetron (ZOFRAN ODT) 4 mg disintegrating tablet 4 mg.  traMADol (ULTRAM) 50 mg tablet 50 mg.    HYDROcodone-acetaminophen (NORCO) 5-325 mg per tablet Take 1 Tab by Mouth Every 4 Hours As Needed for Pain.  docusate sodium (COLACE) 100 mg capsule Take  by Mouth.  esomeprazole (NEXIUM) 20 mg capsule 20 mg.    levothyroxine (SYNTHROID) 112 mcg tablet 112 mcg.  hydroCHLOROthiazide (HYDRODIURIL) 25 mg tablet     simvastatin (ZOCOR) 5 mg tablet     HYDROcodone-acetaminophen (NORCO) 5-325 mg per tablet Take 1 Tab by mouth two (2) times daily as needed for Pain. Max Daily Amount: 2 Tabs.  lisinopril-hydroCHLOROthiazide (PRINZIDE, ZESTORETIC) 20-25 mg per tablet Take 1 Tab by mouth daily.  simvastatin (ZOCOR) 10 mg tablet Take 1 Tab by mouth nightly.  levothyroxine (SYNTHROID) 112 mcg tablet Take 1 Tab by mouth Daily (before breakfast). Indications: hypothyroidism    cholecalciferol (VITAMIN D3) 1,000 unit cap Take  by mouth daily.  esomeprazole (NEXIUM) 40 mg capsule Take 1 Cap by mouth daily.  promethazine (PHENERGAN) 25 mg tablet Take 1 Tab by mouth every eight (8) hours as needed for Nausea. No current facility-administered medications for this visit.        No Known Allergies     Review of Systems:  Positive in BOLD    CONST: Fever, weight loss, fatigue or chills  GI: Nausea, vomiting, abdominal pain, change in bowel habits, hematochezia, melena, and GERD   INTEG: Dermatitis, abnormal moles  HEENT: Recent changes in vision, vertigo, epistaxis, dysphagia and hoarseness  CV: Chest pain, palpitations, HTN, edema and varicosities  RESP: Cough, shortness of breath, wheezing, hemoptysis, snoring and reactive airway disease  : Hematuria, dysuria, frequency, urgency, nocturia and stress urinary incontinence   MS: Weakness, joint pain and arthritis  ENDO: Diabetes, thyroid disease, polyuria, polydipsia, polyphagia, poor wound healing, heat intolerance, cold intolerance  LYMPH/HEME: Anemia, bruising and history of blood transfusions  NEURO: Dizziness, headache, fainting, seizures and stroke  PSYCH: Anxiety and depression      Objective:     Visit Vitals    BP 99/60 (BP 1 Location: Left arm, BP Patient Position: At rest)    Pulse 90    Temp 95.7 °F (35.4 °C) (Oral)    Resp 20    Ht 5' 5\" (1.651 m)    Wt 103 kg (227 lb)    BMI 37.77 kg/m2       Physical Exam:      GENERAL: alert, cooperative, no distress, appears stated age  EYE:conjunctivae and sclerae normal, pupils equal, round, reactive to light, extraocular movements intact without nystagmus  THROAT & NECK: no erythema or exudates noted and neck supple and symmetrical; no palpable masses  LUNG: clear to auscultation bilaterally  HEART: Regular rate and rhythm  ABDOMEN:abdomen is soft without significant tenderness, masses, organomegaly or guarding  EXTREMITIES:  extremities normal, atraumatic, no cyanosis or edema  SKIN: Normal.    Imaging and Lab Review:   Findings of ultrasound of the abdomen: gallstones and thickened gallbladder wall. No results found for this or any previous visit (from the past 24 hour(s)). images and reports reviewed    Assessment:   Chronic cholecystitis. Patient has significant symptoms and wishes to proceed with surgical intervention. Plan:   I explained the indications for laparoscopic cholecystectomy as well as the alternatives. I discussed the potential risks, benefits, and likely outcomes of this course of care, including but not limited to bleeding, wound infection, need for reoperation, bile leak, hernia formation, trocar injuries to small bowel and other structures. We discussed also the possible need for conversion to open procedure as well as a less than one percent risk of common bile duct injury.   We have also discussed the indications for cholangiogram including identification of the cystic duct, verification of normal ductal anatomy and evaluating choledocholithiasis. We have also discussed that she may require an ERCP if there are retained common bile duct stones which may necessitate and additional drain placement and hospital stay. She indicates that she understands the risks, accepts and wishes to proceed. She indicates that she understands the risks, accepts and wishes to proceed.        Signed By: Dayna Gomez MD     March 10, 2017

## 2017-03-10 NOTE — LETTER
3/10/2017 3:02 PM 
 
Patient:  Lela Mary  
YOB: 1970 Date of Visit: 3/10/2017 Author MD Yoan 
[address] [facility] [address] [facility] [address] [place] [address] VIA In Basket Dear Author MD Yoan, Thank you for referring Ms. Emerson Walters to [place] [id number] for evaluation and treatment. Below are the relevant portions of my assessment and plan of care. Surgery Consultation Subjective:  
 
Marcio Hill is a 48 y.o. female with a history of abdominal pain. She complains of right upper quadrant pain, typically associated  with fatty foods. She has had nausea and no vomiting and bloating. The symptoms began in October 2016 and then worsened this past week. The patient  has not had jaundice, acholic stools or dark urine and has not had a history of pancreatitis or hepatitis. Patient Active Problem List  
 Diagnosis Date Noted  Hypovitaminosis D 01/12/2017  Essential hypertension 11/09/2016  Hypothyroidism due to acquired atrophy of thyroid 09/08/2016  
 Hiatal hernia 03/28/2016  Fibromyalgia 10/21/2015  Gastroparesis 10/18/2012  GERD (gastroesophageal reflux disease) 10/18/2012  Hashimoto's thyroiditis 03/20/2012  Hyperlipidemia 03/20/2012 Past Medical History:  
Diagnosis Date  Endometriosis  Essential hypertension 11/9/2016  Fibromyalgia 10/21/2015  Gastroparesis 10/18/2012  GERD (gastroesophageal reflux disease) 10/18/2012  Hashimoto's thyroiditis 3/20/2012  Hiatal hernia 3/28/2016  Hyperlipidemia 3/20/2012  Hypothyroidism 3/20/2012  Hypothyroidism due to acquired atrophy of thyroid 9/8/2016  Hypovitaminosis D 1/12/2017 Past Surgical History:  
Procedure Laterality Date  HX GYN  7/2004  
 partial hysterectomy for endometriosis, has both ovaries.  HX KNEE ARTHROSCOPY  2/2015 Dr. Shazia Abreu  HX OVARIAN CYST REMOVAL  6/2015.  VAG HYST,RMV TUBE/OVARY  6-2015 Dr. Samantha Torres Social History Substance Use Topics  Smoking status: Never Smoker  Smokeless tobacco: Never Used  Alcohol use No  
  
Family History Problem Relation Age of Onset  Diabetes Mother  Thyroid Disease Mother [place] Shaker Arthritis-osteo Mother  Elevated Lipids Mother  Diabetes Maternal Aunt  Diabetes Maternal Grandfather Current Outpatient Prescriptions Medication Sig  
 dicyclomine (BENTYL) 20 mg tablet 20 mg.  
 docusate sodium (COLACE) 100 mg capsule 100 mg.  ibuprofen (MOTRIN) 800 mg tablet 800 mg.  
 ondansetron (ZOFRAN ODT) 4 mg disintegrating tablet 4 mg.  traMADol (ULTRAM) 50 mg tablet 50 mg.  
 HYDROcodone-acetaminophen (NORCO) 5-325 mg per tablet Take 1 Tab by Mouth Every 4 Hours As Needed for Pain.  docusate sodium (COLACE) 100 mg capsule Take  by Mouth.  esomeprazole (NEXIUM) 20 mg capsule 20 mg.  
 levothyroxine (SYNTHROID) 112 mcg tablet 112 mcg.  hydroCHLOROthiazide (HYDRODIURIL) 25 mg tablet  simvastatin (ZOCOR) 5 mg tablet  HYDROcodone-acetaminophen (NORCO) 5-325 mg per tablet Take 1 Tab by mouth two (2) times daily as needed for Pain. Max Daily Amount: 2 Tabs.  lisinopril-hydroCHLOROthiazide (PRINZIDE, ZESTORETIC) 20-25 mg per tablet Take 1 Tab by mouth daily.  simvastatin (ZOCOR) 10 mg tablet Take 1 Tab by mouth nightly.  levothyroxine (SYNTHROID) 112 mcg tablet Take 1 Tab by mouth Daily (before breakfast). Indications: hypothyroidism  cholecalciferol (VITAMIN D3) 1,000 unit cap Take  by mouth daily.  esomeprazole (NEXIUM) 40 mg capsule Take 1 Cap by mouth daily.  promethazine (PHENERGAN) 25 mg tablet Take 1 Tab by mouth every eight (8) hours as needed for Nausea. No current facility-administered medications for this visit. No Known Allergies Review of Systems:  Positive in BOLD 
 
CONST: Fever, weight loss, fatigue or chills GI: Nausea, vomiting, abdominal pain, change in bowel habits, hematochezia, melena, and GERD INTEG: Dermatitis, abnormal moles HEENT: Recent changes in vision, vertigo, epistaxis, dysphagia and hoarseness CV: Chest pain, palpitations, HTN, edema and varicosities RESP: Cough, shortness of breath, wheezing, hemoptysis, snoring and reactive airway disease : Hematuria, dysuria, frequency, urgency, nocturia and stress urinary incontinence MS: Weakness, joint pain and arthritis ENDO: Diabetes, thyroid disease, polyuria, polydipsia, polyphagia, poor wound healing, heat intolerance, cold intolerance LYMPH/HEME: Anemia, bruising and history of blood transfusions NEURO: Dizziness, headache, fainting, seizures and stroke PSYCH: Anxiety and depression Objective:  
 
Visit Vitals  BP 99/60 (BP 1 Location: Left arm, BP Patient Position: At rest)  Pulse 90  Temp 95.7 °F (35.4 °C) (Oral)  Resp 20  
 Ht 5' 5\" (1.651 m)  Wt 103 kg (227 lb)  BMI 37.77 kg/m2 Physical Exam:   
 
GENERAL: alert, cooperative, no distress, appears stated age EYE:conjunctivae and sclerae normal, pupils equal, round, reactive to light, extraocular movements intact without nystagmus THROAT & NECK: no erythema or exudates noted and neck supple and symmetrical; no palpable masses LUNG: clear to auscultation bilaterally HEART: Regular rate and rhythm ABDOMEN:abdomen is soft without significant tenderness, masses, organomegaly or guarding EXTREMITIES:  extremities normal, atraumatic, no cyanosis or edema SKIN: Normal. 
 
Imaging and Lab Review:  
Findings of ultrasound of the abdomen: gallstones and thickened gallbladder wall. No results found for this or any previous visit (from the past 24 hour(s)). images and reports reviewed Assessment:  
Chronic cholecystitis. Patient has significant symptoms and wishes to proceed with surgical intervention. Plan: I explained the indications for laparoscopic cholecystectomy as well as the alternatives. I discussed the potential risks, benefits, and likely outcomes of this course of care, including but not limited to bleeding, wound infection, need for reoperation, bile leak, hernia formation, trocar injuries to small bowel and other structures. We discussed also the possible need for conversion to open procedure as well as a less than one percent risk of common bile duct injury. We have also discussed the indications for cholangiogram including identification of the cystic duct, verification of normal ductal anatomy and evaluating choledocholithiasis. We have also discussed that she may require an ERCP if there are retained common bile duct stones which may necessitate and additional drain placement and hospital stay. She indicates that she understands the risks, accepts and wishes to proceed. She indicates that she understands the risks, accepts and wishes to proceed. Signed By: Noemi Riggs MD   
 March 10, 2017 Thank you very much for your referral of Ms. Lyudmila Katz. If you have questions, please do not hesitate to call me. I look forward to following Ms. Blount along with you and will keep you updated as to her progress.   
 
 
 
 
Sincerely, 
 
 
Noemi Riggs MD

## 2017-03-14 DIAGNOSIS — K59.01 SLOW TRANSIT CONSTIPATION: ICD-10-CM

## 2017-03-14 DIAGNOSIS — R10.84 GENERALIZED ABDOMINAL PAIN: ICD-10-CM

## 2017-03-20 RX ORDER — OMEPRAZOLE/SODIUM BICARBONATE 20MG-1.1G
1 CAPSULE ORAL DAILY
COMMUNITY
End: 2017-07-06 | Stop reason: ALTCHOICE

## 2017-03-24 ENCOUNTER — ANESTHESIA EVENT (OUTPATIENT)
Dept: SURGERY | Age: 47
End: 2017-03-24
Payer: COMMERCIAL

## 2017-03-27 ENCOUNTER — ANESTHESIA (OUTPATIENT)
Dept: SURGERY | Age: 47
End: 2017-03-27
Payer: COMMERCIAL

## 2017-03-27 ENCOUNTER — HOSPITAL ENCOUNTER (OUTPATIENT)
Age: 47
Setting detail: OUTPATIENT SURGERY
Discharge: HOME | End: 2017-03-27
Attending: SURGERY | Admitting: SURGERY
Payer: COMMERCIAL

## 2017-03-27 ENCOUNTER — SURGERY (OUTPATIENT)
Age: 47
End: 2017-03-27

## 2017-03-27 ENCOUNTER — APPOINTMENT (OUTPATIENT)
Dept: GENERAL RADIOLOGY | Age: 47
End: 2017-03-27
Attending: SURGERY
Payer: COMMERCIAL

## 2017-03-27 VITALS
OXYGEN SATURATION: 98 % | TEMPERATURE: 97.1 F | SYSTOLIC BLOOD PRESSURE: 102 MMHG | DIASTOLIC BLOOD PRESSURE: 60 MMHG | HEIGHT: 65 IN | BODY MASS INDEX: 37.82 KG/M2 | WEIGHT: 227 LBS | RESPIRATION RATE: 16 BRPM | HEART RATE: 81 BPM

## 2017-03-27 LAB
BUN BLD-MCNC: 23 MG/DL (ref 7–18)
CHLORIDE BLD-SCNC: 104 MMOL/L (ref 100–108)
GLUCOSE BLD STRIP.AUTO-MCNC: 100 MG/DL (ref 74–106)
HCT VFR BLD CALC: 36 % (ref 36–49)
HGB BLD-MCNC: 12.2 G/DL (ref 12–16)
POTASSIUM BLD-SCNC: 4.5 MMOL/L (ref 3.5–5.5)
SODIUM BLD-SCNC: 139 MMOL/L (ref 136–145)

## 2017-03-27 PROCEDURE — 76060000032 HC ANESTHESIA 0.5 TO 1 HR: Performed by: SURGERY

## 2017-03-27 PROCEDURE — 74011250637 HC RX REV CODE- 250/637: Performed by: NURSE ANESTHETIST, CERTIFIED REGISTERED

## 2017-03-27 PROCEDURE — 77030032490 HC SLV COMPR SCD KNE COVD -B: Performed by: SURGERY

## 2017-03-27 PROCEDURE — 77030013079 HC BLNKT BAIR HGGR 3M -A: Performed by: ANESTHESIOLOGY

## 2017-03-27 PROCEDURE — 77030009852 HC PCH RTVR ENDOSC COVD -B: Performed by: SURGERY

## 2017-03-27 PROCEDURE — 82947 ASSAY GLUCOSE BLOOD QUANT: CPT

## 2017-03-27 PROCEDURE — 74011250636 HC RX REV CODE- 250/636: Performed by: NURSE ANESTHETIST, CERTIFIED REGISTERED

## 2017-03-27 PROCEDURE — 74011000250 HC RX REV CODE- 250

## 2017-03-27 PROCEDURE — 77030008771 HC TU NG SALEM SUMP -A: Performed by: ANESTHESIOLOGY

## 2017-03-27 PROCEDURE — 74011250636 HC RX REV CODE- 250/636

## 2017-03-27 PROCEDURE — 74011000250 HC RX REV CODE- 250: Performed by: SURGERY

## 2017-03-27 PROCEDURE — 74011636320 HC RX REV CODE- 636/320: Performed by: SURGERY

## 2017-03-27 PROCEDURE — 74011250636 HC RX REV CODE- 250/636: Performed by: SURGERY

## 2017-03-27 PROCEDURE — 76210000021 HC REC RM PH II 0.5 TO 1 HR: Performed by: SURGERY

## 2017-03-27 PROCEDURE — 77030010507 HC ADH SKN DERMBND J&J -B: Performed by: SURGERY

## 2017-03-27 PROCEDURE — 77030035048 HC TRCR ENDOSC OPTCL COVD -B: Performed by: SURGERY

## 2017-03-27 PROCEDURE — 77030002933 HC SUT MCRYL J&J -A: Performed by: SURGERY

## 2017-03-27 PROCEDURE — 77030018836 HC SOL IRR NACL ICUM -A: Performed by: SURGERY

## 2017-03-27 PROCEDURE — 76010000138 HC OR TIME 0.5 TO 1 HR: Performed by: SURGERY

## 2017-03-27 PROCEDURE — 76210000016 HC OR PH I REC 1 TO 1.5 HR: Performed by: SURGERY

## 2017-03-27 PROCEDURE — 88304 TISSUE EXAM BY PATHOLOGIST: CPT | Performed by: SURGERY

## 2017-03-27 PROCEDURE — 77030016151 HC PROTCTR LNS DFOG COVD -B: Performed by: SURGERY

## 2017-03-27 PROCEDURE — 77030008683 HC TU ET CUF COVD -A: Performed by: ANESTHESIOLOGY

## 2017-03-27 PROCEDURE — 77030035045 HC TRCR ENDOSC VRSPRT BLDLSS COVD -B: Performed by: SURGERY

## 2017-03-27 PROCEDURE — 77030027491 HC SHR ENDOSC COVD -B: Performed by: SURGERY

## 2017-03-27 PROCEDURE — 77030008477 HC STYL SATN SLP COVD -A: Performed by: ANESTHESIOLOGY

## 2017-03-27 PROCEDURE — 77030035051: Performed by: SURGERY

## 2017-03-27 PROCEDURE — 77030020255 HC SOL INJ LR 1000ML BG: Performed by: SURGERY

## 2017-03-27 RX ORDER — ONDANSETRON 2 MG/ML
INJECTION INTRAMUSCULAR; INTRAVENOUS AS NEEDED
Status: DISCONTINUED | OUTPATIENT
Start: 2017-03-27 | End: 2017-03-27 | Stop reason: HOSPADM

## 2017-03-27 RX ORDER — ONDANSETRON 2 MG/ML
4 INJECTION INTRAMUSCULAR; INTRAVENOUS
Status: DISCONTINUED | OUTPATIENT
Start: 2017-03-27 | End: 2017-03-27 | Stop reason: HOSPADM

## 2017-03-27 RX ORDER — BUPIVACAINE HYDROCHLORIDE AND EPINEPHRINE 5; 5 MG/ML; UG/ML
INJECTION, SOLUTION EPIDURAL; INTRACAUDAL; PERINEURAL AS NEEDED
Status: DISCONTINUED | OUTPATIENT
Start: 2017-03-27 | End: 2017-03-27 | Stop reason: HOSPADM

## 2017-03-27 RX ORDER — INSULIN LISPRO 100 [IU]/ML
INJECTION, SOLUTION INTRAVENOUS; SUBCUTANEOUS ONCE
Status: DISCONTINUED | OUTPATIENT
Start: 2017-03-27 | End: 2017-03-27 | Stop reason: HOSPADM

## 2017-03-27 RX ORDER — FENTANYL CITRATE 50 UG/ML
25 INJECTION, SOLUTION INTRAMUSCULAR; INTRAVENOUS AS NEEDED
Status: DISCONTINUED | OUTPATIENT
Start: 2017-03-27 | End: 2017-03-27 | Stop reason: HOSPADM

## 2017-03-27 RX ORDER — DEXAMETHASONE SODIUM PHOSPHATE 4 MG/ML
INJECTION, SOLUTION INTRA-ARTICULAR; INTRALESIONAL; INTRAMUSCULAR; INTRAVENOUS; SOFT TISSUE AS NEEDED
Status: DISCONTINUED | OUTPATIENT
Start: 2017-03-27 | End: 2017-03-27 | Stop reason: HOSPADM

## 2017-03-27 RX ORDER — GLYCOPYRROLATE 0.2 MG/ML
INJECTION INTRAMUSCULAR; INTRAVENOUS AS NEEDED
Status: DISCONTINUED | OUTPATIENT
Start: 2017-03-27 | End: 2017-03-27 | Stop reason: HOSPADM

## 2017-03-27 RX ORDER — SODIUM CHLORIDE, SODIUM LACTATE, POTASSIUM CHLORIDE, CALCIUM CHLORIDE 600; 310; 30; 20 MG/100ML; MG/100ML; MG/100ML; MG/100ML
50 INJECTION, SOLUTION INTRAVENOUS CONTINUOUS
Status: DISCONTINUED | OUTPATIENT
Start: 2017-03-27 | End: 2017-03-27 | Stop reason: HOSPADM

## 2017-03-27 RX ORDER — FAMOTIDINE 20 MG/1
20 TABLET, FILM COATED ORAL ONCE
Status: COMPLETED | OUTPATIENT
Start: 2017-03-27 | End: 2017-03-27

## 2017-03-27 RX ORDER — PROPOFOL 10 MG/ML
INJECTION, EMULSION INTRAVENOUS AS NEEDED
Status: DISCONTINUED | OUTPATIENT
Start: 2017-03-27 | End: 2017-03-27 | Stop reason: HOSPADM

## 2017-03-27 RX ORDER — SODIUM CHLORIDE 0.9 % (FLUSH) 0.9 %
5-10 SYRINGE (ML) INJECTION AS NEEDED
Status: DISCONTINUED | OUTPATIENT
Start: 2017-03-27 | End: 2017-03-27 | Stop reason: HOSPADM

## 2017-03-27 RX ORDER — TRAMADOL HYDROCHLORIDE 50 MG/1
50 TABLET ORAL
Qty: 15 TAB | Refills: 0 | Status: SHIPPED | OUTPATIENT
Start: 2017-03-27 | End: 2017-07-06 | Stop reason: ALTCHOICE

## 2017-03-27 RX ORDER — SCOLOPAMINE TRANSDERMAL SYSTEM 1 MG/1
1.5 PATCH, EXTENDED RELEASE TRANSDERMAL
Status: DISCONTINUED | OUTPATIENT
Start: 2017-03-27 | End: 2017-03-27 | Stop reason: HOSPADM

## 2017-03-27 RX ORDER — NEOSTIGMINE METHYLSULFATE 5 MG/5 ML
SYRINGE (ML) INTRAVENOUS AS NEEDED
Status: DISCONTINUED | OUTPATIENT
Start: 2017-03-27 | End: 2017-03-27 | Stop reason: HOSPADM

## 2017-03-27 RX ORDER — LIDOCAINE HYDROCHLORIDE 20 MG/ML
INJECTION, SOLUTION EPIDURAL; INFILTRATION; INTRACAUDAL; PERINEURAL AS NEEDED
Status: DISCONTINUED | OUTPATIENT
Start: 2017-03-27 | End: 2017-03-27 | Stop reason: HOSPADM

## 2017-03-27 RX ORDER — CEFAZOLIN SODIUM 2 G/50ML
2 SOLUTION INTRAVENOUS ONCE
Status: COMPLETED | OUTPATIENT
Start: 2017-03-27 | End: 2017-03-27

## 2017-03-27 RX ORDER — FENTANYL CITRATE 50 UG/ML
INJECTION, SOLUTION INTRAMUSCULAR; INTRAVENOUS AS NEEDED
Status: DISCONTINUED | OUTPATIENT
Start: 2017-03-27 | End: 2017-03-27 | Stop reason: HOSPADM

## 2017-03-27 RX ORDER — SODIUM CHLORIDE 0.9 % (FLUSH) 0.9 %
5-10 SYRINGE (ML) INJECTION EVERY 8 HOURS
Status: DISCONTINUED | OUTPATIENT
Start: 2017-03-27 | End: 2017-03-27 | Stop reason: HOSPADM

## 2017-03-27 RX ORDER — MIDAZOLAM HYDROCHLORIDE 1 MG/ML
INJECTION, SOLUTION INTRAMUSCULAR; INTRAVENOUS AS NEEDED
Status: DISCONTINUED | OUTPATIENT
Start: 2017-03-27 | End: 2017-03-27 | Stop reason: HOSPADM

## 2017-03-27 RX ORDER — ROCURONIUM BROMIDE 10 MG/ML
INJECTION, SOLUTION INTRAVENOUS AS NEEDED
Status: DISCONTINUED | OUTPATIENT
Start: 2017-03-27 | End: 2017-03-27 | Stop reason: HOSPADM

## 2017-03-27 RX ADMIN — BUPIVACAINE HYDROCHLORIDE AND EPINEPHRINE BITARTRATE 30 ML: 5; .0091 INJECTION, SOLUTION EPIDURAL; INTRACAUDAL; PERINEURAL at 11:41

## 2017-03-27 RX ADMIN — IOVERSOL 5 ML: 678 INJECTION INTRA-ARTERIAL; INTRAVENOUS at 12:01

## 2017-03-27 RX ADMIN — ROCURONIUM BROMIDE 25 MG: 10 INJECTION, SOLUTION INTRAVENOUS at 11:22

## 2017-03-27 RX ADMIN — FENTANYL CITRATE 50 MCG: 50 INJECTION, SOLUTION INTRAMUSCULAR; INTRAVENOUS at 11:17

## 2017-03-27 RX ADMIN — FAMOTIDINE 20 MG: 20 TABLET, FILM COATED ORAL at 09:19

## 2017-03-27 RX ADMIN — Medication 3 MG: at 12:05

## 2017-03-27 RX ADMIN — LIDOCAINE HYDROCHLORIDE 100 MG: 20 INJECTION, SOLUTION EPIDURAL; INFILTRATION; INTRACAUDAL; PERINEURAL at 11:22

## 2017-03-27 RX ADMIN — CEFAZOLIN SODIUM 2 G: 2 SOLUTION INTRAVENOUS at 11:20

## 2017-03-27 RX ADMIN — SODIUM CHLORIDE, SODIUM LACTATE, POTASSIUM CHLORIDE, AND CALCIUM CHLORIDE 50 ML/HR: 600; 310; 30; 20 INJECTION, SOLUTION INTRAVENOUS at 09:19

## 2017-03-27 RX ADMIN — FENTANYL CITRATE 50 MCG: 50 INJECTION, SOLUTION INTRAMUSCULAR; INTRAVENOUS at 11:19

## 2017-03-27 RX ADMIN — GLYCOPYRROLATE 0.4 MG: 0.2 INJECTION INTRAMUSCULAR; INTRAVENOUS at 12:05

## 2017-03-27 RX ADMIN — PROPOFOL 170 MG: 10 INJECTION, EMULSION INTRAVENOUS at 11:22

## 2017-03-27 RX ADMIN — ROCURONIUM BROMIDE 10 MG: 10 INJECTION, SOLUTION INTRAVENOUS at 11:35

## 2017-03-27 RX ADMIN — FENTANYL CITRATE 25 MCG: 50 INJECTION, SOLUTION INTRAMUSCULAR; INTRAVENOUS at 12:40

## 2017-03-27 RX ADMIN — FENTANYL CITRATE 25 MCG: 50 INJECTION, SOLUTION INTRAMUSCULAR; INTRAVENOUS at 12:55

## 2017-03-27 RX ADMIN — DEXAMETHASONE SODIUM PHOSPHATE 4 MG: 4 INJECTION, SOLUTION INTRA-ARTICULAR; INTRALESIONAL; INTRAMUSCULAR; INTRAVENOUS; SOFT TISSUE at 11:30

## 2017-03-27 RX ADMIN — MIDAZOLAM HYDROCHLORIDE 2 MG: 1 INJECTION, SOLUTION INTRAMUSCULAR; INTRAVENOUS at 11:17

## 2017-03-27 RX ADMIN — ONDANSETRON 4 MG: 2 INJECTION INTRAMUSCULAR; INTRAVENOUS at 11:30

## 2017-03-27 NOTE — DISCHARGE INSTRUCTIONS
Cholecystectomy: What to Expect at [address] [place] [address]  After your surgery, it is normal to feel weak and tired for several days after you return home. Your belly may be swollen. If you had laparoscopic surgery, you may also have pain in your shoulder for about 24 hours. You may have gas or need to burp a lot at first, and a few people get diarrhea. The diarrhea usually goes away in 2 to 4 weeks, but it may last longer. How quickly you recover depends on whether you had a laparoscopic or open surgery. · For a laparoscopic surgery, most people can go back to work or their normal routine in 1 to 2 weeks, but it may take longer, depending on the type of work you do. · For an open surgery, it will probably take 4 to 6 weeks before you get back to your normal routine. This care sheet gives you a general idea about how long it will take for you to recover. However, each person recovers at a different pace. Follow the steps below to get better as quickly as possible. How can you care for yourself at home? Activity  · Rest when you feel tired. Getting enough sleep will help you recover. · Try to walk each day. Start out by walking a little more than you did the day before. Gradually increase the amount you walk. Walking boosts blood flow and helps prevent pneumonia and constipation. · For about 2 to 4 weeks, avoid lifting anything that would make you strain. This may include a child, heavy grocery bags and milk containers, a heavy briefcase or backpack, cat litter or dog food bags, or a vacuum . · Avoid strenuous activities, such as biking, jogging, weightlifting, and aerobic exercise, until your doctor says it is okay. · You may shower 24 to 48 hours after surgery, if your doctor okays it. Pat the cut (incision) dry. Do not take a bath for the first 2 weeks, or until your doctor tells you it is okay.   · You may drive when you are no longer taking pain medicine and can quickly move your foot from the gas pedal to the brake. You must also be able to sit comfortably for a long period of time, even if you do not plan to go far. You might get caught in traffic. · For a laparoscopic surgery, most people can go back to work or their normal routine in 1 to 2 weeks, but it may take longer. For an open surgery, it will probably take 4 to 6 weeks before you get back to your normal routine. · Your doctor will tell you when you can have sex again. Diet  · Eat smaller meals more often instead of fewer larger meals. You can eat a normal diet, but avoid eating fatty foods for about 1 month. Fatty foods include hamburger, whole milk, cheese, and many snack foods. If your stomach is upset, try bland, low-fat foods like plain rice, broiled chicken, toast, and yogurt. · Drink plenty of fluids (unless your doctor tells you not to). · If you have diarrhea, try avoiding spicy foods, dairy products, fatty foods, and alcohol. You can also watch to see if specific foods cause it, and stop eating them. If the diarrhea continues for more than 2 weeks, talk to your doctor. · You may notice that your bowel movements are not regular right after your surgery. This is common. Try to avoid constipation and straining with bowel movements. You may want to take a fiber supplement every day. If you have not had a bowel movement after a couple of days, ask your doctor about taking a mild laxative. Medicines  · Your doctor will tell you if and when you can restart your medicines. He or she will also give you instructions about taking any new medicines. · If you take blood thinners, such as warfarin (Coumadin), clopidogrel (Plavix), or aspirin, be sure to talk to your doctor. He or she will tell you if and when to start taking those medicines again. Make sure that you understand exactly what your doctor wants you to do. · Take pain medicines exactly as directed.   ¨ If the doctor gave you a prescription medicine for pain, take it as prescribed. ¨ If you are not taking a prescription pain medicine, take an over-the-counter medicine such as acetaminophen (Tylenol), ibuprofen (Advil, Motrin), or naproxen (Aleve). Read and follow all instructions on the label. ¨ Do not take two or more pain medicines at the same time unless the doctor told you to. Many pain medicines contain acetaminophen, which is Tylenol. Too much Tylenol can be harmful. · If you think your pain medicine is making you sick to your stomach:  ¨ Take your medicine after meals (unless your doctor tells you not to). ¨ Ask your doctor for a different pain medicine. · If your doctor prescribed antibiotics, take them as directed. Do not stop taking them just because you feel better. You need to take the full course of antibiotics. Incision care  · If you have strips of tape on the incision, or cut, leave the tape on for a week or until it falls off. · After 24 to 48 hours, wash the area daily with warm, soapy water, and pat it dry. · You may have staples to hold the cut together. Keep them dry until your doctor takes them out. This is usually in 7 to 10 days. · Keep the area clean and dry. You may cover it with a gauze bandage if it weeps or rubs against clothing. Change the bandage every day. Ice  · To reduce swelling and pain, put ice or a cold pack on your belly for 10 to 20 minutes at a time. Do this every 1 to 2 hours. Put a thin cloth between the ice and your skin. Follow-up care is a key part of your treatment and safety. Be sure to make and go to all appointments, and call your doctor if you are having problems. It's also a good idea to know your test results and keep a list of the medicines you take. When should you call for help? Call 911 anytime you think you may need emergency care. For example, call if:  · You passed out (lost consciousness). · You have severe trouble breathing. · You have sudden chest pain and shortness of breath, or you cough up blood.   Call your doctor now or seek immediate medical care if:  · You are sick to your stomach and cannot drink fluids. · You have pain that does not get better when you take your pain medicine. · You have signs of infection, such as:  ¨ Increased pain, swelling, warmth, or redness. ¨ Red streaks leading from the incision. ¨ Pus draining from the incision. ¨ Swollen lymph nodes in your neck, armpits, or groin. ¨ A fever. · Your urine turns dark brown or your stool is light-colored or hilda-colored. · Your skin or the whites of your eyes turn yellow. · Bright red blood has soaked through a large bandage over your incision. · You have signs of a blood clot, such as:  ¨ Pain in your calf, back of knee, thigh, or groin. ¨ Redness and swelling in your leg or groin. · You have trouble passing urine or stool, especially if you have mild pain or swelling in your lower belly. Watch closely for any changes in your health, and be sure to contact your doctor if:  · You had a laparoscopic surgery and your shoulder pain lasts more than 24 hours. · You do not have a bowel movement after taking a laxative. Where can you learn more? Go to http://yoel-osvaldo.info/. Enter 103 05 306 in the search box to learn more about \"Cholecystectomy: What to Expect at Home. \"  Current as of: August 9, 2016  Content Version: 11.1  © 0347-9740 [employer]. Care instructions adapted under license by [employer] (which disclaims liability or warranty for this information). If you have questions about a medical condition or this instruction, always ask your healthcare professional. [place] [address] any warranty or liability for your use of this information.       DISCHARGE SUMMARY from Nurse    The following personal items are in your possession at time of discharge:    Dental Appliances: None  Visual Aid: None     Home Medications: None  Jewelry: None  Clothing: Undergarments, Shirt, Socks, Footwear, Pants  Other Valuables: None  Personal Items Sent to Safe: in closet          PATIENT INSTRUCTIONS:    After general anesthesia or intravenous sedation, for 24 hours or while taking prescription Narcotics:  · Limit your activities  · Do not drive and operate hazardous machinery  · Do not make important personal or business decisions  · Do  not drink alcoholic beverages  · If you have not urinated within 8 hours after discharge, please contact your surgeon on call. Report the following to your surgeon:  · Excessive pain, swelling, redness or odor of or around the surgical area  · Temperature over 100.5  · Nausea and vomiting lasting longer than 4 hours or if unable to take medications  · Any signs of decreased circulation or nerve impairment to extremity: change in color, persistent  numbness, tingling, coldness or increase pain  · Any questions        What to do at Home:    These are general instructions for a healthy lifestyle:    No smoking/ No tobacco products/ Avoid exposure to second hand smoke    Surgeon General's Warning:  Quitting smoking now greatly reduces serious risk to your health. Obesity, smoking, and sedentary lifestyle greatly increases your risk for illness    A healthy diet, regular physical exercise & weight monitoring are important for maintaining a healthy lifestyle    You may be retaining fluid if you have a history of heart failure or if you experience any of the following symptoms:  Weight gain of 3 pounds or more overnight or 5 pounds in a week, increased swelling in our hands or feet or shortness of breath while lying flat in bed. Please call your doctor as soon as you notice any of these symptoms; do not wait until your next office visit.     Recognize signs and symptoms of STROKE:    F-face looks uneven    A-arms unable to move or move unevenly    S-speech slurred or non-existent    T-time-call 911 as soon as signs and symptoms begin-DO NOT go       Back to bed or wait to see if you get better-TIME IS BRAIN. Warning Signs of HEART ATTACK     Call 911 if you have these symptoms:   Chest discomfort. Most heart attacks involve discomfort in the center of the chest that lasts more than a few minutes, or that goes away and comes back. It can feel like uncomfortable pressure, squeezing, fullness, or pain.  Discomfort in other areas of the upper body. Symptoms can include pain or discomfort in one or both arms, the back, neck, jaw, or stomach.  Shortness of breath with or without chest discomfort.  Other signs may include breaking out in a cold sweat, nausea, or lightheadedness. Don't wait more than five minutes to call 911 - MINUTES MATTER! Fast action can save your life. Calling 911 is almost always the fastest way to get lifesaving treatment. Emergency Medical Services staff can begin treatment when they arrive -- up to an hour sooner than if someone gets to the hospital by car. The discharge information has been reviewed with the patient. The patient verbalized understanding. Discharge medications reviewed with the patient and appropriate educational materials and side effects teaching were provided. Patient armband removed and given to patient to take home.   Patient was informed of the privacy risks if armband lost or stolen

## 2017-03-27 NOTE — OP NOTES
Laparoscopic Cholecystectomy with IOC Procedure Note    Pre-operative Diagnosis: Chronic cholecystitis    Post-operative Diagnosis:  Chronic cholecystitis    Procedure: Laparoscopic Cholecystectomy with Intraoperative Cholangiogram    Surgeon: Lona Childs MD, FACS    Assistant:  Caleb Duenas    Anesthesia: General endotracheal anesthetic    Findings:  A normal intraoperative cholangiogram with normal filling of the intrahepatic ducts, common hepatic duct and common bile duct. There was rapid emptying into the duodenum without evidence of obstruction or stone. Estimated Blood Loss:  10 ml           Drains: None            Specimens: Gallbladder             Complications:  None           Description of the Procedure:     After the patient was place under general anesthetic, the abdomen was prepped and draped in the standard sterile fashion. The area on the inferior margin of the umbilicus was anesthetized with the local anesthetic mixture. Then an incision was made on the inferior margin of the umbilicus. A [employer] 5 mm optical trocar was placed over the trocar in then directed into the abdomen via this incision using Optiview technique. The abdomen was then insufflated to 15 mmHg and surveyed. There was no evidence of injury from the injury. Attention was directed to the right upper quadrant. Additional trocars were placed. A 12 mm trocar was placed in the epigastrium just to the right of the midline. Two 5 mm trocars were place in the midclavicular and anterior axillary positions, each 2 fingerbreadths below the costal margin. All trocars were placed after anesthetizing the sites with local anesthetic and incising the skin with scalpel. All trocars were place using blunt dissecting technique. There was no evidence of injury from the entries. After placing the patient in reverse Trendelenburg position and rolled to the left, instruments were place into the abdominal cavity.  The dome of the gallbladder was grasped and retracted superiorly exposing the infundibulum. The infundibulum was the grapsed and retracted, exposing the triangle of Calot. The triangle of Calot was dissected anteromedial and posterolateral, cleaning the cystic duct and cystic artery from all surrounding structures. The cystic duct was clipped at its connection with the gallbladder neck. The cystic duct was then partially transected just proximal to the clip. At this time, a 14 gauge angiocatheter was placed through the abdominal wall and a cholangiogram catheter was placed through the angiocath, into the abdominal cavity and then into the cystic duct, securing with a clip. The cholangiogram was then obtained with the findings as noted above. After completion of the cholangiogram, the clip was removed from the cystic duct and the cholangiogram catheter was removed from the patient. The cystic duct was doubly clipped just proximal to the partial transection and the transection was completed. The cystic artery was then divided between double clips. The gallbladder was then dissected from the gallbladder fossa using hook cautery. The gallbladder was then removed from the patient at the epigastric trocar site and passed off the field as specimen. The right upper quadrant was inspected. The clips were noted to be in good position. The gallbladder fossa was hemostatic. No bile leakage was noted. The right upper quadrant trocars were removed sequentially. No bleeding was noted from these trocar sites into the abdominal cavity. The abdomen was desufflated via the umbilical trocar which was then removed. All skin sites were closed with 4-0 Monocryl subcuticular sutures and dressed with Dermabond. The patient was awakened and transferred to the recovery room in good condition.

## 2017-03-27 NOTE — INTERVAL H&P NOTE
H&P Update:  Michael Caputo was seen and examined. History and physical has been reviewed. The patient has been examined.  There have been no significant clinical changes since the completion of the originally dated History and Physical.    Signed By: Leti Arechiga MD     March 27, 2017 10:39 AM

## 2017-03-27 NOTE — IP AVS SNAPSHOT
[address] [address] 
[phone] Patient: Belvie Riedel 
MRN: [id number] [facility]:6/60/[id number] You are allergic to the following Allergen Reactions Morphine Myalgia Simvastatin Other (comments) Severe muscle cramps Recent Documentation Height Weight BMI OB Status Smoking Status 1.651 m 103 kg 37.77 kg/m2 Hysterectomy Never Smoker Emergency Contacts Name Discharge Info Relation Home Work Mobile Felipe [facility] CAREGIVER [3] Friend [5] [phone] About your hospitalization You were admitted on:  March 27, 2017 You last received care in the:  [facility] PHASE 2 RECOVERY You were discharged on:  March 27, 2017 Unit phone number:  [phone] Why you were hospitalized Your primary diagnosis was:  Not on File Providers Seen During Your Hospitalizations Provider Role Specialty Primary office phone Ismael Reese MD Attending Provider General Surgery [phone] Your Primary Care Physician (PCP) Primary Care Physician Office Phone Office Fax Radha Murillo [phone] Follow-up Information Follow up With Details Comments Contact Info Claudia Floyd MD   [address] [address] 
[phone] Your Appointments Friday March 31, 2017  7:30 AM EDT Follow Up with Claudia Floyd MD  
[address]) [address] 2201 [facility] [id number] [phone] Friday April 14, 2017  2:15 PM EDT  
POST OP with Ismael Reese MD  
[address] [place] [address] [id number] [place] [id number]  
[phone] Current Discharge Medication List  
  
CONTINUE these medications which have CHANGED Dose & Instructions Dispensing Information Comments Morning Noon Evening Bedtime * traMADol 50 mg tablet Commonly known as:  ULTRAM  
What changed:  Another medication with the same name was added. Make sure you understand how and when to take each. Your last dose was: Your next dose is:    
   
   
 Dose:  50 mg Take 50 mg by mouth every six (6) hours as needed. Refills:  0  
     
   
   
   
  
 * traMADol 50 mg tablet Commonly known as:  ULTRAM  
What changed: You were already taking a medication with the same name, and this prescription was added. Make sure you understand how and when to take each. Your last dose was: Your next dose is:    
   
   
 Dose:  50 mg Take 1 Tab by mouth every six (6) hours as needed for Pain. Max Daily Amount: 200 mg. Quantity:  15 Tab Refills:  0  
     
   
   
   
  
 * Notice: This list has 2 medication(s) that are the same as other medications prescribed for you. Read the directions carefully, and ask your doctor or other care provider to review them with you. CONTINUE these medications which have NOT CHANGED Dose & Instructions Dispensing Information Comments Morning Noon Evening Bedtime COQ10  PO Your last dose was: Your next dose is: Take  by mouth daily. Refills:  0  
     
   
   
   
  
 docusate sodium 100 mg capsule Commonly known as:  [place] Mai Your last dose was: Your next dose is: Take  by Mouth. Refills:  0  
     
   
   
   
  
 ibuprofen 800 mg tablet Commonly known as:  MOTRIN Your last dose was: Your next dose is:    
   
   
 Dose:  800 mg  
800 mg. Refills:  0  
     
   
   
   
  
 levothyroxine 112 mcg tablet Commonly known as:  SYNTHROID Your last dose was: Your next dose is:    
   
   
 Dose:  112 mcg Take 1 Tab by mouth Daily (before breakfast). Indications: hypothyroidism Quantity:  90 Tab Refills:  1  
     
   
   
   
  
 lisinopril-hydroCHLOROthiazide 20-25 mg per tablet Commonly known as:  Camejessicaa Rasphillip Your last dose was: Your next dose is:    
   
   
 Dose:  1 Tab Take 1 Tab by mouth daily. Quantity:  90 Tab Refills:  1  
     
   
   
   
  
 ondansetron 4 mg disintegrating tablet Commonly known as:  ZOFRAN ODT Your last dose was: Your next dose is:    
   
   
 Dose:  4 mg  
4 mg. Refills:  0  
     
   
   
   
  
 VITAMIN D3 1,000 unit Cap Generic drug:  cholecalciferol Your last dose was: Your next dose is: Take  by mouth daily. Refills:  0 ZEGERID 20-1.1 mg-gram capsule Generic drug:  omeprazole-sodium bicarbonate Your last dose was: Your next dose is:    
   
   
 Dose:  1 Cap Take 1 Cap by mouth daily. Refills:  0 Where to Get Your Medications Information on where to get these meds will be given to you by the nurse or doctor. ! Ask your nurse or doctor about these medications  
  traMADol 50 mg tablet Discharge Instructions Cholecystectomy: What to Expect at [facility] Your Recovery After your surgery, it is normal to feel weak and tired for several days after you return home. Your belly may be swollen. If you had laparoscopic surgery, you may also have pain in your shoulder for about 24 hours. You may have gas or need to burp a lot at first, and a few people get diarrhea. The diarrhea usually goes away in 2 to 4 weeks, but it may last longer. How quickly you recover depends on whether you had a laparoscopic or open surgery. · For a laparoscopic surgery, most people can go back to work or their normal routine in 1 to 2 weeks, but it may take longer, depending on the type of work you do.  
· For an open surgery, it will probably take 4 to 6 weeks before you get back to your normal routine. This care sheet gives you a general idea about how long it will take for you to recover. However, each person recovers at a different pace. Follow the steps below to get better as quickly as possible. How can you care for yourself at home? Activity · Rest when you feel tired. Getting enough sleep will help you recover. · Try to walk each day. Start out by walking a little more than you did the day before. Gradually increase the amount you walk. Walking boosts blood flow and helps prevent pneumonia and constipation. · For about 2 to 4 weeks, avoid lifting anything that would make you strain. This may include a child, heavy grocery bags and milk containers, a heavy briefcase or backpack, cat litter or dog food bags, or a vacuum . · Avoid strenuous activities, such as biking, jogging, weightlifting, and aerobic exercise, until your doctor says it is okay. · You may shower 24 to 48 hours after surgery, if your doctor okays it. Pat the cut (incision) dry. Do not take a bath for the first 2 weeks, or until your doctor tells you it is okay. · You may drive when you are no longer taking pain medicine and can quickly move your foot from the gas pedal to the brake. You must also be able to sit comfortably for a long period of time, even if you do not plan to go far. You might get caught in traffic. · For a laparoscopic surgery, most people can go back to work or their normal routine in 1 to 2 weeks, but it may take longer. For an open surgery, it will probably take 4 to 6 weeks before you get back to your normal routine. · Your doctor will tell you when you can have sex again. Diet · Eat smaller meals more often instead of fewer larger meals. You can eat a normal diet, but avoid eating fatty foods for about 1 month. Fatty foods include hamburger, whole milk, cheese, and many snack foods.  If your stomach is upset, try bland, low-fat foods like plain rice, broiled chicken, toast, and yogurt. · Drink plenty of fluids (unless your doctor tells you not to). · If you have diarrhea, try avoiding spicy foods, dairy products, fatty foods, and alcohol. You can also watch to see if specific foods cause it, and stop eating them. If the diarrhea continues for more than 2 weeks, talk to your doctor. · You may notice that your bowel movements are not regular right after your surgery. This is common. Try to avoid constipation and straining with bowel movements. You may want to take a fiber supplement every day. If you have not had a bowel movement after a couple of days, ask your doctor about taking a mild laxative. Medicines · Your doctor will tell you if and when you can restart your medicines. He or she will also give you instructions about taking any new medicines. · If you take blood thinners, such as warfarin (Coumadin), clopidogrel (Plavix), or aspirin, be sure to talk to your doctor. He or she will tell you if and when to start taking those medicines again. Make sure that you understand exactly what your doctor wants you to do. · Take pain medicines exactly as directed. ¨ If the doctor gave you a prescription medicine for pain, take it as prescribed. ¨ If you are not taking a prescription pain medicine, take an over-the-counter medicine such as acetaminophen (Tylenol), ibuprofen (Advil, Motrin), or naproxen (Aleve). Read and follow all instructions on the label. ¨ Do not take two or more pain medicines at the same time unless the doctor told you to. Many pain medicines contain acetaminophen, which is Tylenol. Too much Tylenol can be harmful. · If you think your pain medicine is making you sick to your stomach: 
¨ Take your medicine after meals (unless your doctor tells you not to). ¨ Ask your doctor for a different pain medicine. · If your doctor prescribed antibiotics, take them as directed.  Do not stop taking them just because you feel better. You need to take the full course of antibiotics. Incision care · If you have strips of tape on the incision, or cut, leave the tape on for a week or until it falls off. · After 24 to 48 hours, wash the area daily with warm, soapy water, and pat it dry. · You may have staples to hold the cut together. Keep them dry until your doctor takes them out. This is usually in 7 to 10 days. · Keep the area clean and dry. You may cover it with a gauze bandage if it weeps or rubs against clothing. Change the bandage every day. Ice · To reduce swelling and pain, put ice or a cold pack on your belly for 10 to 20 minutes at a time. Do this every 1 to 2 hours. Put a thin cloth between the ice and your skin. Follow-up care is a key part of your treatment and safety. Be sure to make and go to all appointments, and call your doctor if you are having problems. It's also a good idea to know your test results and keep a list of the medicines you take. When should you call for help? Call 911 anytime you think you may need emergency care. For example, call if: 
· You passed out (lost consciousness). · You have severe trouble breathing. · You have sudden chest pain and shortness of breath, or you cough up blood. Call your doctor now or seek immediate medical care if: 
· You are sick to your stomach and cannot drink fluids. · You have pain that does not get better when you take your pain medicine. · You have signs of infection, such as: 
¨ Increased pain, swelling, warmth, or redness. ¨ Red streaks leading from the incision. ¨ Pus draining from the incision. ¨ Swollen lymph nodes in your neck, armpits, or groin. ¨ A fever. · Your urine turns dark brown or your stool is light-colored or hilda-colored. · Your skin or the whites of your eyes turn yellow. · Bright red blood has soaked through a large bandage over your incision. · You have signs of a blood clot, such as: ¨ Pain in your calf, back of knee, thigh, or groin. ¨ Redness and swelling in your leg or groin. · You have trouble passing urine or stool, especially if you have mild pain or swelling in your lower belly. Watch closely for any changes in your health, and be sure to contact your doctor if: 
· You had a laparoscopic surgery and your shoulder pain lasts more than 24 hours. · You do not have a bowel movement after taking a laxative. Where can you learn more? Go to http://yoel-osvaldo.info/. Enter [phone] in the search box to learn more about \"Cholecystectomy: What to Expect at Home. \" Current as of: August 9, 2016 Content Version: 11.1 © 6020-1238 [employer]. Care instructions adapted under license by [employer] (which disclaims liability or warranty for this information). If you have questions about a medical condition or this instruction, always ask your healthcare professional. [address] [address] any warranty or liability for your use of this information. DISCHARGE SUMMARY from Nurse The following personal items are in your possession at time of discharge: 
 
Dental Appliances: None Visual Aid: None Home Medications: None Jewelry: None Clothing: Undergarments, Shirt, Socks, Footwear, Pants Other Valuables: None Personal Items Sent to Safe: in closet PATIENT INSTRUCTIONS: 
 
After general anesthesia or intravenous sedation, for 24 hours or while taking prescription Narcotics: · Limit your activities · Do not drive and operate hazardous machinery · Do not make important personal or business decisions · Do  not drink alcoholic beverages · If you have not urinated within 8 hours after discharge, please contact your surgeon on call. Report the following to your surgeon: 
· Excessive pain, swelling, redness or odor of or around the surgical area · Temperature over 100.5 · Nausea and vomiting lasting longer than 4 hours or if unable to take medications · Any signs of decreased circulation or nerve impairment to extremity: change in color, persistent  numbness, tingling, coldness or increase pain · Any questions What to do at Home: These are general instructions for a healthy lifestyle: No smoking/ No tobacco products/ Avoid exposure to second hand smoke Surgeon General's Warning:  Quitting smoking now greatly reduces serious risk to your health. Obesity, smoking, and sedentary lifestyle greatly increases your risk for illness A healthy diet, regular physical exercise & weight monitoring are important for maintaining a healthy lifestyle You may be retaining fluid if you have a history of heart failure or if you experience any of the following symptoms:  Weight gain of 3 pounds or more overnight or 5 pounds in a week, increased swelling in our hands or feet or shortness of breath while lying flat in bed. Please call your doctor as soon as you notice any of these symptoms; do not wait until your next office visit. Recognize signs and symptoms of STROKE: 
 
F-face looks uneven A-arms unable to move or move unevenly S-speech slurred or non-existent T-time-call 911 as soon as signs and symptoms begin-DO NOT go Back to bed or wait to see if you get better-TIME IS BRAIN. Warning Signs of HEART ATTACK Call 911 if you have these symptoms: 
? Chest discomfort. Most heart attacks involve discomfort in the center of the chest that lasts more than a few minutes, or that goes away and comes back. It can feel like uncomfortable pressure, squeezing, fullness, or pain. ? Discomfort in other areas of the upper body. Symptoms can include pain or discomfort in one or both arms, the back, neck, jaw, or stomach. ? Shortness of breath with or without chest discomfort. ? Other signs may include breaking out in a cold sweat, nausea, or lightheadedness. Don't wait more than five minutes to call [address]! Fast action can save your life. Calling 911 is almost always the fastest way to get lifesaving treatment. Emergency Medical Services staff can begin treatment when they arrive  up to an hour sooner than if someone gets to the hospital by car. The discharge information has been reviewed with the patient. The patient verbalized understanding. Discharge medications reviewed with the patient and appropriate educational materials and side effects teaching were provided. Patient armband removed and given to patient to take home. Patient was informed of the privacy risks if armband lost or stolen Discharge Orders None Introducing [facility]! Dear Susan Villalobos: Thank you for requesting a [email or web address] account. Our records indicate that you already have an active [email or web address] account. You can access your account anytime at [email or web address] Did you know that you can access your hospital and ER discharge instructions at any time in [email or web address]? You can also review all of your test results from your hospital stay or ER visit. Additional Information If you have questions, please visit the Frequently Asked Questions section of the [email or web address] website at [email or web address] Remember, [email or web address] is NOT to be used for urgent needs. For medical emergencies, dial 911. Now available from your iPhone and Android! General Information Please provide this summary of care documentation to your next provider. Patient Signature:  ____________________________________________________________ Date:  ____________________________________________________________  
  
Yahir Martin Provider Signature:  ____________________________________________________________ Date:  ____________________________________________________________

## 2017-03-27 NOTE — ANESTHESIA PREPROCEDURE EVALUATION
Anesthetic History     PONV          Review of Systems / Medical History  Patient summary reviewed and pertinent labs reviewed    Pulmonary  Within defined limits                 Neuro/Psych   Within defined limits           Cardiovascular    Hypertension: well controlled          Hyperlipidemia         GI/Hepatic/Renal     GERD: poorly controlled      Hiatal hernia     Endo/Other      Hypothyroidism: well controlled       Other Findings   Comments:   Risk Factors for Postoperative nausea/vomiting:       History of postoperative nausea/vomiting? YES       Female? YES       Motion sickness? NO       Intended opioid administration for postoperative analgesia?   YES           Physical Exam    Airway  Mallampati: III  TM Distance: 4 - 6 cm  Neck ROM: normal range of motion   Mouth opening: Diminished (comment)     Cardiovascular    Rhythm: regular  Rate: normal         Dental  No notable dental hx       Pulmonary  Breath sounds clear to auscultation               Abdominal  GI exam deferred       Other Findings            Anesthetic Plan    ASA: 2  Anesthesia type: general          Induction: Intravenous  Anesthetic plan and risks discussed with: Patient

## 2017-03-27 NOTE — PERIOP NOTES
1215:  Patient received from OR on a stretcher. Attached to monitor. VSS. Attached to oxygen via non-rebreather at 10L. OR report, anesthesia report and MAR acknowledged. Patient denies pain at this time. Will continue to monitor.

## 2017-03-27 NOTE — ANESTHESIA POSTPROCEDURE EVALUATION
Post-Anesthesia Evaluation and Assessment    Patient: Belvie Riedel MRN: [id number]  SSN: [id number]    YOB: 1970  Age: 50 y.o. Sex: female       Cardiovascular Function/Vital Signs  Visit Vitals    /56    Pulse 86    Temp 36.2 °C (97.1 °F)    Resp 14    Ht 5' 5\" (1.651 m)    Wt 103 kg (227 lb)    SpO2 97%    BMI 37.77 kg/m2       Patient is status post general anesthesia for Procedure(s):  LAPAROSCOPIC CHOLECYSTECTOMY  WITH INTRAOP CHOLANGIOGRAM.    Nausea/Vomiting: None    Postoperative hydration reviewed and adequate. Pain:  Pain Scale 1: Numeric (0 - 10) (03/27/17 0829)  Pain Intensity 1: 0 (03/27/17 0829)   Managed    Neurological Status:   Neuro (WDL): Within Defined Limits (03/27/17 1133)   At baseline    Mental Status and Level of Consciousness: Arousable    Pulmonary Status:   O2 Device: Non-rebreather mask (03/27/17 1220)   Adequate oxygenation and airway patent    Complications related to anesthesia: None    Post-anesthesia assessment completed.  No concerns    Signed By: Gray Ventura MD     March 27, 2017

## 2017-03-31 ENCOUNTER — OFFICE VISIT (OUTPATIENT)
Dept: FAMILY MEDICINE CLINIC | Age: 47
End: 2017-03-31

## 2017-03-31 VITALS
BODY MASS INDEX: 37.49 KG/M2 | SYSTOLIC BLOOD PRESSURE: 110 MMHG | RESPIRATION RATE: 20 BRPM | HEART RATE: 84 BPM | TEMPERATURE: 98.1 F | OXYGEN SATURATION: 96 % | DIASTOLIC BLOOD PRESSURE: 76 MMHG | WEIGHT: 225 LBS | HEIGHT: 65 IN

## 2017-03-31 DIAGNOSIS — Z90.49 S/P CHOLECYSTECTOMY: ICD-10-CM

## 2017-03-31 DIAGNOSIS — E78.00 PURE HYPERCHOLESTEROLEMIA: Primary | ICD-10-CM

## 2017-03-31 DIAGNOSIS — E78.00 PURE HYPERCHOLESTEROLEMIA: ICD-10-CM

## 2017-03-31 RX ORDER — PRAVASTATIN SODIUM 10 MG/1
10 TABLET ORAL
Qty: 90 TAB | Refills: 0 | Status: SHIPPED | OUTPATIENT
Start: 2017-03-31 | End: 2017-09-14 | Stop reason: SDUPTHER

## 2017-03-31 NOTE — MR AVS SNAPSHOT
Visit Information Date & Time Provider Department Dept. Phone Encounter #  
 3/31/2017  7:30 AM Mau Benítez [facility] [phone] [id number] Your Appointments 4/14/2017  2:15 PM  
POST OP with Radha Bo MD  
[facility] Appt Note: post op appointment [address]  
  
   
 [facility] [address] Upcoming Health Maintenance Date Due  
 BREAST CANCER SCRN MAMMOGRAM 6/2/2018 DTaP/Tdap/Td series (2 - Td) 7/31/2025 Allergies as of 3/31/2017  Review Complete On: 3/31/2017 By: Robbie Metzger MD  
  
 Severity Noted Reaction Type Reactions Morphine  03/20/2017    Myalgia Simvastatin  03/20/2017    Other (comments) Severe muscle cramps Current Immunizations  Reviewed on 9/8/2016 Name Date Influenza Vaccine 10/15/2015 Influenza Vaccine (Quad) PF 9/8/2016 Influenza Vaccine Split 10/18/2012 Tdap 7/31/2015 Not reviewed this visit You Were Diagnosed With   
  
 Codes Comments Pure hypercholesterolemia    -  Primary ICD-10-CM: E78.00 ICD-9-CM: 272.0 Vitals BP Pulse Temp Resp Height(growth percentile) Weight(growth percentile) 110/76 84 98.1 °F (36.7 °C) (Oral) 20 5' 5\" (1.651 m) 225 lb (102.1 kg) SpO2 BMI OB Status Smoking Status 96% 37.44 kg/m2 Hysterectomy Never Smoker BMI and BSA Data Body Mass Index Body Surface Area  
 37.44 kg/m 2 2.16 m 2 Preferred Pharmacy Pharm[facility] Phone [facility] [phone] Your Updated Medication List  
  
   
This list is accurate as of: 3/31/17  7:51 AM.  Always use your most recent med list.  
  
  
  
  
 COQ10  PO Take  by mouth daily. docusate sodium 100 mg capsule Commonly known as:  Kim Peels Take  by Mouth. ibuprofen 800 mg tablet Commonly known as:  MOTRIN  
800 mg.  
  
 levothyroxine 112 mcg tablet Commonly known as:  SYNTHROID Take 1 Tab by mouth Daily (before breakfast). Indications: hypothyroidism  
  
 lisinopril-hydroCHLOROthiazide 20-25 mg per tablet Commonly known as:  Reggie Kemps Take 1 Tab by mouth daily. ondansetron 4 mg disintegrating tablet Commonly known as:  ZOFRAN ODT  
4 mg. pravastatin 10 mg tablet Commonly known as:  PRAVACHOL Take 1 Tab by mouth nightly. * traMADol 50 mg tablet Commonly known as:  ULTRAM  
Take 50 mg by mouth every six (6) hours as needed. * traMADol 50 mg tablet Commonly known as:  ULTRAM  
Take 1 Tab by mouth every six (6) hours as needed for Pain. Max Daily Amount: 200 mg. VITAMIN D3 1,000 unit Cap Generic drug:  cholecalciferol Take  by mouth daily. ZEGERID 20-1.1 mg-gram capsule Generic drug:  omeprazole-sodium bicarbonate Take 1 Cap by mouth daily. * Notice: This list has 2 medication(s) that are the same as other medications prescribed for you. Read the directions carefully, and ask your doctor or other care provider to review them with you. Prescriptions Sent to Pharmacy Refills  
 pravastatin (PRAVACHOL) 10 mg tablet 0 Sig: Take 1 Tab by mouth nightly. Class: Normal  
 Pharmacy: [address] [place] [address] #: [phone] Route: Oral  
  
To-Do List   
 03/31/2017 Lab:  HEPATIC FUNCTION PANEL   
  
 03/31/2017 Lab:  LIPID PANEL Patient Instructions High Cholesterol: Care Instructions Your Care Instructions Cholesterol is a type of fat in your blood. It is needed for many body functions, such as making new cells. Cholesterol is made by your body. It also comes from food you eat. High cholesterol means that you have too much of the fat in your blood. This raises your risk of a heart attack and stroke. LDL and HDL are part of your total cholesterol. LDL is the \"bad\" cholesterol. High LDL can raise your risk for heart disease, heart attack, and stroke. HDL is the \"good\" cholesterol. It helps clear bad cholesterol from the body. High HDL is linked with a lower risk of heart disease, heart attack, and stroke. Your cholesterol levels help your doctor find out your risk for having a heart attack or stroke. You and your doctor can talk about whether you need to lower your risk and what treatment is best for you. A heart-healthy lifestyle along with medicines can help lower your cholesterol and your risk. The way you choose to lower your risk will depend on how high your risk is for heart attack and stroke. It will also depend on how you feel about taking medicines. Follow-up care is a key part of your treatment and safety. Be sure to make and go to all appointments, and call your doctor if you are having problems. It's also a good idea to know your test results and keep a list of the medicines you take. How can you care for yourself at home? · Eat a variety of foods every day. Good choices include fruits, vegetables, whole grains (like oatmeal), dried beans and peas, nuts and seeds, soy products (like tofu), and fat-free or low-fat dairy products. · Replace butter, margarine, and hydrogenated or partially hydrogenated oils with olive and canola oils. (Canola oil margarine without trans fat is fine.) · Replace red meat with fish, poultry, and soy protein (like tofu). · Limit processed and packaged foods like chips, crackers, and cookies. · Bake, broil, or steam foods. Don't yeh them. · Be physically active. Get at least 30 minutes of exercise on most days of the week. Walking is a good choice. You also may want to do other activities, such as running, swimming, cycling, or playing tennis or team sports.  
· Stay at a healthy weight or lose weight by making the changes in eating and physical activity listed above. Losing just a small amount of weight, even 5 to 10 pounds, can reduce your risk for having a heart attack or stroke. · Do not smoke. When should you call for help? Watch closely for changes in your health, and be sure to contact your doctor if: 
· You need help making lifestyle changes. · You have questions about your medicine. Where can you learn more? Go to http://yoel-osvaldo.info/. Enter X288 in the search box to learn more about \"High Cholesterol: Care Instructions. \" Current as of: January 27, 2016 Content Version: 11.2 © 1143-8090 [email or web address]. Care instructions adapted under license by [employer] (which disclaims liability or warranty for this information). If you have questions about a medical condition or this instruction, always ask your healthcare professional. [address] any warranty or liability for your use of this information. Introducing [facility] [place] & HEALTH SERVICES! Dear Mynor Check: Thank you for requesting a [employer] account. Our records indicate that you already have an active [employer] account. You can access your account anytime at [email or web address] Did you know that you can access your hospital and ER discharge instructions at any time in [employer]? You can also review all of your test results from your hospital stay or ER visit. Additional Information If you have questions, please visit the Frequently Asked Questions section of the [employer] website at [email or web address] Remember, [employer] is NOT to be used for urgent needs. For medical emergencies, dial 911. Now available from your iPhone and Android! Please provide this summary of care documentation to your next provider. Your primary care clinician is listed as Fredy 51. If you have any questions after today's visit, please call [phone].

## 2017-03-31 NOTE — PROGRESS NOTES
Raul Clemens is a 50 y.o. female  Patient here for follow up for HTN. Patient states she just had Gael Senegal bladder removed 03/27/17. 1. Have you been to the ER, urgent care clinic since your last visit? Hospitalized since your last visit? No    2. Have you seen or consulted any other health care providers outside of the [address] [place] [address] since your last visit? Include any pap smears or colon screening.  Yes Where: Endo

## 2017-03-31 NOTE — PROGRESS NOTES
Assessment/Plan:    *Ross Springer was seen today for hypertension. Diagnoses and all orders for this visit:    Pure hypercholesterolemia  -     pravastatin (PRAVACHOL) 10 mg tablet; Take 1 Tab by mouth nightly. -     LIPID PANEL; Future  -     HEPATIC FUNCTION PANEL; Future    S/P cholecystectomy        If she has issue with pravachol, will notify me on my chart. Otherwise, will f/u 3 mon, BW prior. The plan was discussed with the patient. The patient verbalized understanding and is in agreement with the plan. All medication potential side effects were discussed with the patient.    -------------------------------------------------------------------------------------------------------------------        Khadijah Mendez is a 50 y.o. female and presents with Hypertension         Subjective:  Pt here for f/u. Just had a cholecystectomy on 3/27/17 and has done well. Has been to see Dr. Joel Jerry for the multinodular thyroid. Stable. HLD: still having trouble tolerating Zocor. Myalgias. ROS:  Constitutional: No recent weight change. No weakness/fatigue. No f/c. Skin: No rashes, change in nails/hair, itching   HENT: No HA, dizziness. No hearing loss/tinnitus. No nasal congestion/discharge. Eyes: No change in vision, double/blurred vision or eye pain/redness. Cardiovascular: No CP/palpitations. No GRANDE/orthopnea/PND. Respiratory: No cough/sputum, dyspnea, wheezing. Gastointestinal: No dysphagia, reflux. No n/v. No constipation/diarrhea. No melena/rectal bleeding. Genitourinary: No dysuria, urinary hesitancy, nocturia, hematuria. No incontinence. Musculoskeletal: No joint pain/stiffness. No muscle pain/tenderness. Endo: No heat/cold intolerance, no polyuria/polydypsia. Heme: No h/o anemia. No easy bleeding/bruising. Allergy/Immunology: No seasonal rhinitis. Denies frequent colds, sinus/ear infections. Neurological: No seizures/numbness/weakness. No paresthesias.    Psychiatric: No depression, anxiety. The problem list was updated as a part of today's visit. Patient Active Problem List   Diagnosis Code    Hashimoto's thyroiditis E06.3    Hyperlipidemia E78.5    Gastroparesis K31.84    GERD (gastroesophageal reflux disease) K21.9    Fibromyalgia M79.7    Hiatal hernia K44.9    Hypothyroidism due to acquired atrophy of thyroid E03.4    Essential hypertension I10    Hypovitaminosis D E55.9       The PSH, FH were reviewed. SH:  Social History   Substance Use Topics    Smoking status: Never Smoker    Smokeless tobacco: Never Used    Alcohol use No       Medications/Allergies:  Current Outpatient Prescriptions on File Prior to Visit   Medication Sig Dispense Refill    traMADol (ULTRAM) 50 mg tablet Take 1 Tab by mouth every six (6) hours as needed for Pain. Max Daily Amount: 200 mg. 15 Tab 0    omeprazole-sodium bicarbonate (ZEGERID) 20-1.1 mg-gram capsule Take 1 Cap by mouth daily.  UBIDECARENONE/VITAMIN E MIXED (COQ10  PO) Take  by mouth daily.  docusate sodium (COLACE) 100 mg capsule Take  by Mouth.  ibuprofen (MOTRIN) 800 mg tablet 800 mg.      ondansetron (ZOFRAN ODT) 4 mg disintegrating tablet 4 mg.  traMADol (ULTRAM) 50 mg tablet Take 50 mg by mouth every six (6) hours as needed.  lisinopril-hydroCHLOROthiazide (PRINZIDE, ZESTORETIC) 20-25 mg per tablet Take 1 Tab by mouth daily. 90 Tab 1    levothyroxine (SYNTHROID) 112 mcg tablet Take 1 Tab by mouth Daily (before breakfast). Indications: hypothyroidism 90 Tab 1    cholecalciferol (VITAMIN D3) 1,000 unit cap Take  by mouth daily. No current facility-administered medications on file prior to visit.          Allergies   Allergen Reactions    Morphine Myalgia    Simvastatin Other (comments)     Severe muscle cramps         Health Maintenance:   Health Maintenance   Topic Date Due    BREAST CANCER SCRN MAMMOGRAM  06/02/2018    DTaP/Tdap/Td series (2 - Td) 07/31/2025    INFLUENZA AGE 9 TO ADULT  Completed       Objective:  Visit Vitals    /76    Pulse 84    Temp 98.1 °F (36.7 °C) (Oral)    Resp 20    Ht 5' 5\" (1.651 m)    Wt 225 lb (102.1 kg)    SpO2 96%    BMI 37.44 kg/m2          Nurses notes and VS reviewed. Physical Examination: General appearance - alert, well appearing, and in no distress  Abdomen - soft, nontender, nondistended, no masses or organomegaly  Laparoscopic incisional clean. No erythema. Labwork and Ancillary Studies:    CBC w/Diff  Lab Results   Component Value Date/Time    WBC 9.1 09/08/2016 08:18 AM    HGB 14.6 09/08/2016 08:18 AM    PLATELET 793 71/12/2662 08:18 AM         Basic Metabolic Profile  Lab Results   Component Value Date/Time    Sodium 142 09/08/2016 08:18 AM    Potassium 4.7 09/08/2016 08:18 AM    Chloride 99 09/08/2016 08:18 AM    CO2 27 09/08/2016 08:18 AM    Glucose 104 09/08/2016 08:18 AM    BUN 11 09/08/2016 08:18 AM    Creatinine 0.97 09/08/2016 08:18 AM    BUN/Creatinine ratio 11 09/08/2016 08:18 AM    GFR est AA 80 09/08/2016 08:18 AM    GFR est non-AA 69 09/08/2016 08:18 AM    Calcium 9.7 09/08/2016 08:18 AM         LFT  Lab Results   Component Value Date/Time    ALT (SGPT) 25 09/08/2016 08:18 AM    AST (SGOT) 21 09/08/2016 08:18 AM    Alk.  phosphatase 98 09/08/2016 08:18 AM    Bilirubin, direct 0.05 09/08/2016 08:18 AM    Bilirubin, total 0.2 09/08/2016 08:18 AM         Cholesterol  Lab Results   Component Value Date/Time    Cholesterol, total 205 11/09/2016 08:57 AM    HDL Cholesterol 54 11/09/2016 08:57 AM    LDL, calculated 128 11/09/2016 08:57 AM    Triglyceride 115 11/09/2016 08:57 AM

## 2017-03-31 NOTE — PATIENT INSTRUCTIONS

## 2017-04-14 ENCOUNTER — OFFICE VISIT (OUTPATIENT)
Dept: SURGERY | Age: 47
End: 2017-04-14

## 2017-04-14 VITALS
TEMPERATURE: 97.5 F | DIASTOLIC BLOOD PRESSURE: 63 MMHG | RESPIRATION RATE: 20 BRPM | BODY MASS INDEX: 37.49 KG/M2 | SYSTOLIC BLOOD PRESSURE: 115 MMHG | HEIGHT: 65 IN | HEART RATE: 79 BPM | WEIGHT: 225 LBS

## 2017-04-14 DIAGNOSIS — Z09 POSTOPERATIVE EXAMINATION: Primary | ICD-10-CM

## 2017-04-14 RX ORDER — SIMVASTATIN 10 MG/1
TABLET, FILM COATED ORAL
COMMUNITY
Start: 2017-01-11 | End: 2017-07-06 | Stop reason: ALTCHOICE

## 2017-04-14 RX ORDER — HYDROCODONE BITARTRATE AND ACETAMINOPHEN 5; 325 MG/1; MG/1
TABLET ORAL
COMMUNITY
Start: 2017-03-06 | End: 2017-07-06 | Stop reason: ALTCHOICE

## 2017-04-14 NOTE — PROGRESS NOTES
SUBJECTIVE: Laurent Rogers is a 50 y.o. female is seen for a routine postop check after lap jocelin. Reports no problems with the wound or other issues. Activity, diet and bowels are normal. No pain. No dumping    OBJECTIVE: Appears well. Wound is well healed without complications or infection. ASSESSMENT: normal postoperative course, doing well. PLAN: Resume normal activities. Return PRN.

## 2017-04-14 NOTE — MR AVS SNAPSHOT
Visit Information Date & Time Provider Department Dept. Phone Encounter #  
 4/14/2017  2:15 PM Davidson Johnson MD [facility] [phone] [id number] Your Appointments 7/6/2017  7:30 AM  
Follow Up with Nola Jordan MD  
[address]) Appt Note: 3-4 month f/u  
 [address] [facility] [address] [place] [id number]  
[phone]  
  
   
 [address] Upcoming Health Maintenance Date Due  
 BREAST CANCER SCRN MAMMOGRAM 6/2/2018 DTaP/Tdap/Td series (2 - Td) 7/31/2025 Allergies as of 4/14/2017  Review Complete On: 4/14/2017 By: Homero Andrea Severity Noted Reaction Type Reactions Morphine  03/20/2017    Myalgia Simvastatin  03/20/2017    Other (comments) Severe muscle cramps Current Immunizations  Reviewed on 9/8/2016 Name Date Influenza Vaccine 10/15/2015 Influenza Vaccine (Quad) PF 9/8/2016 Influenza Vaccine Split 10/18/2012 Tdap 7/31/2015 Not reviewed this visit You Were Diagnosed With   
  
 Codes Comments Postoperative examination    -  Primary ICD-10-CM: V33 ICD-9-CM: V67.00 Vitals BP Pulse Temp Resp Height(growth percentile) Weight(growth percentile)  
 115/63 (BP 1 Location: Left arm, BP Patient Position: At rest) 79 97.5 °F (36.4 °C) (Oral) 20 5' 5\" (1.651 m) 225 lb (102.1 kg) BMI OB Status Smoking Status 37.44 kg/m2 Hysterectomy Never Smoker BMI and BSA Data Body Mass Index Body Surface Area  
 37.44 kg/m 2 2.16 m 2 Preferred Pharmacy Pharmacy Name Phone [facility] [address], [address] [facility] [phone] Your Updated Medication List  
  
   
This list is accurate as of: 4/14/17  2:27 PM.  Always use your most recent med list.  
  
  
  
  
 COQ10  PO Take  by mouth daily. docusate sodium 100 mg capsule Commonly known as:  Everlina Simpler Take  by Mouth. HYDROcodone-acetaminophen 5-325 mg per tablet Commonly known as:  NORCO  
  
 ibuprofen 800 mg tablet Commonly known as:  MOTRIN  
800 mg.  
  
 levothyroxine 112 mcg tablet Commonly known as:  SYNTHROID Take 1 Tab by mouth Daily (before breakfast). Indications: hypothyroidism  
  
 lisinopril-hydroCHLOROthiazide 20-25 mg per tablet Commonly known as:  [place] Take 1 Tab by mouth daily. ondansetron 4 mg disintegrating tablet Commonly known as:  ZOFRAN ODT  
4 mg. pravastatin 10 mg tablet Commonly known as:  PRAVACHOL Take 1 Tab by mouth nightly. simvastatin 10 mg tablet Commonly known as:  ZOCOR * traMADol 50 mg tablet Commonly known as:  ULTRAM  
Take 50 mg by mouth every six (6) hours as needed. * traMADol 50 mg tablet Commonly known as:  ULTRAM  
Take 1 Tab by mouth every six (6) hours as needed for Pain. Max Daily Amount: 200 mg. VITAMIN D3 1,000 unit Cap Generic drug:  cholecalciferol Take  by mouth daily. ZEGERID 20-1.1 mg-gram capsule Generic drug:  omeprazole-sodium bicarbonate Take 1 Cap by mouth daily. * Notice: This list has 2 medication(s) that are the same as other medications prescribed for you. Read the directions carefully, and ask your doctor or other care provider to review them with you. Introducing [facility] & HEALTH SERVICES! Dear Alisha Figures: Thank you for requesting a [employer] account. Our records indicate that you already have an active [employer] account. You can access your account anytime at [email or web address] Did you know that you can access your hospital and ER discharge instructions at any time in [employer]? You can also review all of your test results from your hospital stay or ER visit. Additional Information If you have questions, please visit the Frequently Asked Questions section of the [employer] website at [email or web address] Remember, [employer] is NOT to be used for urgent needs. For medical emergencies, dial 911. Now available from your iPhone and Android! Please provide this summary of care documentation to your next provider. Your primary care clinician is listed as Fredy 51. If you have any questions after today's visit, please call [phone].

## 2017-04-14 NOTE — PROGRESS NOTES
Thomas Taylor is a 50 y.o. female who presents today with   Chief Complaint   Patient presents with    Surgical Follow-up     Lap jocelin 3/27/2017                1. Have you been to the ER, urgent care clinic since your last visit? Hospitalized since your last visit? No    2. Have you seen or consulted any other health care providers outside of the [facility] since your last visit? Include any pap smears or colon screening.  No

## 2017-07-06 ENCOUNTER — OFFICE VISIT (OUTPATIENT)
Dept: FAMILY MEDICINE CLINIC | Age: 47
End: 2017-07-06

## 2017-07-06 ENCOUNTER — HOSPITAL ENCOUNTER (OUTPATIENT)
Dept: LAB | Age: 47
Discharge: HOME | End: 2017-07-06

## 2017-07-06 VITALS
DIASTOLIC BLOOD PRESSURE: 58 MMHG | RESPIRATION RATE: 17 BRPM | TEMPERATURE: 98.2 F | WEIGHT: 226 LBS | BODY MASS INDEX: 37.65 KG/M2 | HEIGHT: 65 IN | SYSTOLIC BLOOD PRESSURE: 92 MMHG | OXYGEN SATURATION: 98 % | HEART RATE: 77 BPM

## 2017-07-06 DIAGNOSIS — E78.00 PURE HYPERCHOLESTEROLEMIA: ICD-10-CM

## 2017-07-06 DIAGNOSIS — N81.9 FEMALE GENITAL PROLAPSE, UNSPECIFIED TYPE: ICD-10-CM

## 2017-07-06 DIAGNOSIS — E03.4 HYPOTHYROIDISM DUE TO ACQUIRED ATROPHY OF THYROID: ICD-10-CM

## 2017-07-06 DIAGNOSIS — I10 ESSENTIAL HYPERTENSION: Primary | ICD-10-CM

## 2017-07-06 PROCEDURE — 99001 SPECIMEN HANDLING PT-LAB: CPT | Performed by: INTERNAL MEDICINE

## 2017-07-06 NOTE — PROGRESS NOTES
Assessment/Plan:    Vipin De Leon was seen today for abdominal pain. Diagnoses and all orders for this visit:    Essential hypertension    Pure hypercholesterolemia    Female genital prolapse, unspecified type  -     REFERRAL TO UROGYNECOLOGY    Hypothyroidism due to acquired atrophy of thyroid  -     TSH 3RD GENERATION; Future  -     T4, FREE; Future      Will contact pt on results. She will let us know after she makes appt with gyn. The plan was discussed with the patient. The patient verbalized understanding and is in agreement with the plan. All medication potential side effects were discussed with the patient.    -------------------------------------------------------------------------------------------------------------------        Lazaro Alfonso is a 50 y.o. female and presents with Abdominal Pain         Subjective:  Pt here for routine 3 mon f/u. Last seen in March, we changed her from Zocor to Pravachol. Was having myalgias. HTN: well controlled. 2 months ago, taking a shower and felt a bulge in the vaginal opening. Since that time, has noted changes in BMs, can be normal or constipated,  Has had increased freq of urination. Has not noted any loss of fecal matter from the vaginal opening. Feeling more tired than usual, last thyroid check was 3 mon ago with Dr. Nicole Hobson ([facility]) and will not see her again for several months. ROS:  Constitutional: No recent weight change. No weakness/fatigue. No f/c. Skin: No rashes, change in nails/hair, itching   HENT: No HA, dizziness. No hearing loss/tinnitus. No nasal congestion/discharge. Eyes: No change in vision, double/blurred vision or eye pain/redness. Cardiovascular: No CP/palpitations. No GRANDE/orthopnea/PND. Respiratory: No cough/sputum, dyspnea, wheezing. Gastointestinal: No dysphagia, reflux. No n/v. No constipation/diarrhea. No melena/rectal bleeding. Genitourinary: No dysuria, urinary hesitancy, nocturia, hematuria.   No incontinence. Musculoskeletal: No joint pain/stiffness. No muscle pain/tenderness. Endo: No heat/cold intolerance, no polyuria/polydypsia. Heme: No h/o anemia. No easy bleeding/bruising. Allergy/Immunology: No seasonal rhinitis. Denies frequent colds, sinus/ear infections. Neurological: No seizures/numbness/weakness. No paresthesias. Psychiatric:  No depression, anxiety. The problem list was updated as a part of today's visit. Patient Active Problem List   Diagnosis Code    Hashimoto's thyroiditis E06.3    Hyperlipidemia E78.5    Gastroparesis K31.84    GERD (gastroesophageal reflux disease) K21.9    Fibromyalgia M79.7    Hiatal hernia K44.9    Hypothyroidism due to acquired atrophy of thyroid E03.4    Essential hypertension I10    Hypovitaminosis D E55.9       The PSH, FH were reviewed. SH:  Social History   Substance Use Topics    Smoking status: Never Smoker    Smokeless tobacco: Never Used    Alcohol use No       Medications/Allergies:  Current Outpatient Prescriptions on File Prior to Visit   Medication Sig Dispense Refill    pravastatin (PRAVACHOL) 10 mg tablet Take 1 Tab by mouth nightly. 90 Tab 0    UBIDECARENONE/VITAMIN E MIXED (COQ10  PO) Take  by mouth daily.  ibuprofen (MOTRIN) 800 mg tablet 800 mg.      lisinopril-hydroCHLOROthiazide (PRINZIDE, ZESTORETIC) 20-25 mg per tablet Take 1 Tab by mouth daily. 90 Tab 1    levothyroxine (SYNTHROID) 112 mcg tablet Take 1 Tab by mouth Daily (before breakfast). Indications: hypothyroidism 90 Tab 1    cholecalciferol (VITAMIN D3) 1,000 unit cap Take  by mouth daily. No current facility-administered medications on file prior to visit.          Allergies   Allergen Reactions    Codeine Hives     Hydrocodone      Morphine Myalgia    Simvastatin Other (comments)     Severe muscle cramps         Health Maintenance:   Health Maintenance   Topic Date Due    INFLUENZA AGE 9 TO ADULT  08/01/2017    DTaP/Tdap/Td series (2 - Td) 07/31/2025       Objective:  Visit Vitals    BP 92/58 (BP 1 Location: Left arm, BP Patient Position: Sitting)    Pulse 77    Temp 98.2 °F (36.8 °C) (Oral)    Resp 17    Ht 5' 5\" (1.651 m)    Wt 226 lb (102.5 kg)    SpO2 98%    BMI 37.61 kg/m2          Nurses notes and VS reviewed. Physical Examination: General appearance - alert, well appearing, and in no distress  Chest - clear to auscultation, no wheezes, rales or rhonchi, symmetric air entry  Heart - normal rate, regular rhythm, normal S1, S2, no murmurs, rubs, clicks or gallops        Labwork and Ancillary Studies:    CBC w/Diff  Lab Results   Component Value Date/Time    WBC 9.1 09/08/2016 08:18 AM    HGB 14.6 09/08/2016 08:18 AM    PLATELET 064 20/15/1824 08:18 AM         Basic Metabolic Profile  Lab Results   Component Value Date/Time    Sodium 142 09/08/2016 08:18 AM    Potassium 4.7 09/08/2016 08:18 AM    Chloride 99 09/08/2016 08:18 AM    CO2 27 09/08/2016 08:18 AM    Glucose 104 09/08/2016 08:18 AM    BUN 11 09/08/2016 08:18 AM    Creatinine 0.97 09/08/2016 08:18 AM    BUN/Creatinine ratio 11 09/08/2016 08:18 AM    GFR est AA 80 09/08/2016 08:18 AM    GFR est non-AA 69 09/08/2016 08:18 AM    Calcium 9.7 09/08/2016 08:18 AM         LFT  Lab Results   Component Value Date/Time    ALT (SGPT) 25 09/08/2016 08:18 AM    AST (SGOT) 21 09/08/2016 08:18 AM    Alk.  phosphatase 98 09/08/2016 08:18 AM    Bilirubin, direct 0.05 09/08/2016 08:18 AM    Bilirubin, total 0.2 09/08/2016 08:18 AM         Cholesterol  Lab Results   Component Value Date/Time    Cholesterol, total 205 11/09/2016 08:57 AM    HDL Cholesterol 54 11/09/2016 08:57 AM    LDL, calculated 128 11/09/2016 08:57 AM    Triglyceride 115 11/09/2016 08:57 AM

## 2017-07-06 NOTE — PATIENT INSTRUCTIONS

## 2017-07-06 NOTE — PROGRESS NOTES
1. Have you been to the ER, urgent care clinic since your last visit? Hospitalized since your last visit? No     2. Have you seen or consulted any other health care providers outside of the [address] [place] [address] since your last visit? Include any pap smears or colon screening.    Yes, ortho Dr. Solano Early cortisone injection and physical therapy June 2017

## 2017-07-06 NOTE — MR AVS SNAPSHOT
Visit Information Date & Time Provider Department Dept. Phone Encounter #  
 7/6/2017  7:30 AM Mau Pritchett [place] [phone] [id number] Upcoming Health Maintenance Date Due INFLUENZA AGE 9 TO ADULT 8/1/2017 DTaP/Tdap/Td series (2 - Td) 7/31/2025 Allergies as of 7/6/2017  Review Complete On: 7/6/2017 By: Edi Worthington LPN Severity Noted Reaction Type Reactions Codeine Medium 07/06/2017    Hives Hydrocodone Morphine  03/20/2017    Myalgia Simvastatin  03/20/2017    Other (comments) Severe muscle cramps Current Immunizations  Reviewed on 9/8/2016 Name Date Influenza Vaccine 10/15/2015 Influenza Vaccine (Quad) PF 9/8/2016 Influenza Vaccine Split 10/18/2012 Tdap 7/31/2015 Not reviewed this visit You Were Diagnosed With   
  
 Codes Comments Essential hypertension    -  Primary ICD-10-CM: I10 
ICD-9-CM: 401.9 Pure hypercholesterolemia     ICD-10-CM: E78.00 ICD-9-CM: 272.0 Female genital prolapse, unspecified type     ICD-10-CM: N81.9 ICD-9-CM: 618.9 Hypothyroidism due to acquired atrophy of thyroid     ICD-10-CM: E03.4 ICD-9-CM: 244.8, 246.8 Vitals BP Pulse Temp Resp Height(growth percentile) Weight(growth percentile) 92/58 (BP 1 Location: Left arm, BP Patient Position: Sitting) 77 98.2 °F (36.8 °C) (Oral) 17 5' 5\" (1.651 m) 226 lb (102.5 kg) SpO2 BMI OB Status Smoking Status 98% 37.61 kg/m2 Hysterectomy Never Smoker Vitals History BMI and BSA Data Body Mass Index Body Surface Area  
 37.61 kg/m 2 2.17 m 2 Preferred Pharmacy Pharmacy Name Phone [facility] [address] [facility] [phone] Your Updated Medication List  
  
   
This list is accurate as of: 7/6/17  8:14 AM.  Always use your most recent med list.  
  
  
  
  
 COQ10  PO Take  by mouth daily. ibuprofen 800 mg tablet Commonly known as:  MOTRIN  
800 mg.  
  
 levothyroxine 112 mcg tablet Commonly known as:  SYNTHROID Take 1 Tab by mouth Daily (before breakfast). Indications: hypothyroidism  
  
 lisinopril-hydroCHLOROthiazide 20-25 mg per tablet Commonly known as:  Omega  Take 1 Tab by mouth daily. pravastatin 10 mg tablet Commonly known as:  PRAVACHOL Take 1 Tab by mouth nightly. VITAMIN D3 1,000 unit Cap Generic drug:  cholecalciferol Take  by mouth daily. We Performed the Following REFERRAL TO UROGYNECOLOGY [id number] CPT(R)] Comments:  
 Patient is going to check with her [address] [place] [address] [facility] to see if they can see her for this. Dionicio Gallo To-Do List   
 07/06/2017 Lab:  T4, FREE   
  
 07/06/2017 Lab:  TSH 3RD GENERATION Referral Information Referral ID Referred By Referred To  
  
 [id number] Supa DOSHI Not Available Visits Status Start Date End Date 1 New Request 7/6/17 7/6/18 If your referral has a status of pending review or denied, additional information will be sent to support the outcome of this decision. Patient Instructions High Cholesterol: Care Instructions Your Care Instructions Cholesterol is a type of fat in your blood. It is needed for many body functions, such as making new cells. Cholesterol is made by your body. It also comes from food you eat. High cholesterol means that you have too much of the fat in your blood. This raises your risk of a heart attack and stroke. LDL and HDL are part of your total cholesterol. LDL is the \"bad\" cholesterol. High LDL can raise your risk for heart disease, heart attack, and stroke. HDL is the \"good\" cholesterol. It helps clear bad cholesterol from the body. High HDL is linked with a lower risk of heart disease, heart attack, and stroke.  
Your cholesterol levels help your doctor find out your risk for having a heart attack or stroke. You and your doctor can talk about whether you need to lower your risk and what treatment is best for you. A heart-healthy lifestyle along with medicines can help lower your cholesterol and your risk. The way you choose to lower your risk will depend on how high your risk is for heart attack and stroke. It will also depend on how you feel about taking medicines. Follow-up care is a key part of your treatment and safety. Be sure to make and go to all appointments, and call your doctor if you are having problems. It's also a good idea to know your test results and keep a list of the medicines you take. How can you care for yourself at home? · Eat a variety of foods every day. Good choices include fruits, vegetables, whole grains (like oatmeal), dried beans and peas, nuts and seeds, soy products (like tofu), and fat-free or low-fat dairy products. · Replace butter, margarine, and hydrogenated or partially hydrogenated oils with olive and canola oils. (Canola oil margarine without trans fat is fine.) · Replace red meat with fish, poultry, and soy protein (like tofu). · Limit processed and packaged foods like chips, crackers, and cookies. · Bake, broil, or steam foods. Don't yeh them. · Be physically active. Get at least 30 minutes of exercise on most days of the week. Walking is a good choice. You also may want to do other activities, such as running, swimming, cycling, or playing tennis or team sports. · Stay at a healthy weight or lose weight by making the changes in eating and physical activity listed above. Losing just a small amount of weight, even 5 to 10 pounds, can reduce your risk for having a heart attack or stroke. · Do not smoke. When should you call for help? Watch closely for changes in your health, and be sure to contact your doctor if: 
· You need help making lifestyle changes. · You have questions about your medicine. Where can you learn more? Go to http://yoel-osvaldo.info/. Enter K370 in the search box to learn more about \"High Cholesterol: Care Instructions. \" Current as of: April 3, 2017 Content Version: 11.3 © 0766-0358 [employer]. Care instructions adapted under license by [employer] (which disclaims liability or warranty for this information). If you have questions about a medical condition or this instruction, always ask your healthcare professional. [address] any warranty or liability for your use of this information. Introducing [facility] & HEALTH SERVICES! Dear Emerson Dailey: Thank you for requesting a Future Simple account. Our records indicate that you already have an active Future Simple account. You can access your account anytime at [email or web address] Did you know that you can access your hospital and ER discharge instructions at any time in Future Simple? You can also review all of your test results from your hospital stay or ER visit. Additional Information If you have questions, please visit the Frequently Asked Questions section of the Future Simple website at [email or web address] Remember, Future Simple is NOT to be used for urgent needs. For medical emergencies, dial 911. Now available from your iPhone and Android! Please provide this summary of care documentation to your next provider. Your primary care clinician is listed as Fredy 51. If you have any questions after today's visit, please call [phone].

## 2017-07-07 LAB
ALBUMIN SERPL-MCNC: 4.4 G/DL (ref 3.5–5.5)
ALP SERPL-CCNC: 84 IU/L (ref 39–117)
ALT SERPL-CCNC: 14 IU/L (ref 0–32)
AST SERPL-CCNC: 10 IU/L (ref 0–40)
BILIRUB DIRECT SERPL-MCNC: 0.08 MG/DL (ref 0–0.4)
BILIRUB SERPL-MCNC: 0.3 MG/DL (ref 0–1.2)
CHOLEST SERPL-MCNC: 188 MG/DL (ref 100–199)
HDLC SERPL-MCNC: 58 MG/DL
INTERPRETATION, 910389: NORMAL
LDLC SERPL CALC-MCNC: 114 MG/DL (ref 0–99)
PROT SERPL-MCNC: 7 G/DL (ref 6–8.5)
T4 FREE SERPL-MCNC: 1.53 NG/DL (ref 0.82–1.77)
TRIGL SERPL-MCNC: 82 MG/DL (ref 0–149)
TSH SERPL DL<=0.005 MIU/L-ACNC: 3.84 UIU/ML (ref 0.45–4.5)
VLDLC SERPL CALC-MCNC: 16 MG/DL (ref 5–40)

## 2017-08-21 DIAGNOSIS — E03.4 HYPOTHYROIDISM DUE TO ACQUIRED ATROPHY OF THYROID: ICD-10-CM

## 2017-08-21 DIAGNOSIS — I10 ESSENTIAL HYPERTENSION: ICD-10-CM

## 2017-08-21 NOTE — TELEPHONE ENCOUNTER
Patient called noticed legs cramps has been on pravastatin for about 3 months stopped medication for about a week and half taking and they have gotten better is taking Coq10 please advise also needs refills on her synthroid 1/12/17 for 90 with 1  And lisinopril hctz  last filled 11/9/2016 for 90 with 1  call back number [phone]

## 2017-08-22 RX ORDER — LEVOTHYROXINE SODIUM 112 UG/1
112 TABLET ORAL
Qty: 90 TAB | Refills: 1 | Status: SHIPPED | OUTPATIENT
Start: 2017-08-22 | End: 2018-06-10 | Stop reason: SDUPTHER

## 2017-08-22 RX ORDER — LISINOPRIL AND HYDROCHLOROTHIAZIDE 20; 25 MG/1; MG/1
1 TABLET ORAL DAILY
Qty: 90 TAB | Refills: 1 | Status: SHIPPED | OUTPATIENT
Start: 2017-08-22 | End: 2017-08-29 | Stop reason: ALTCHOICE

## 2017-08-29 ENCOUNTER — OFFICE VISIT (OUTPATIENT)
Dept: FAMILY MEDICINE CLINIC | Age: 47
End: 2017-08-29

## 2017-08-29 ENCOUNTER — HOSPITAL ENCOUNTER (OUTPATIENT)
Dept: LAB | Age: 47
Discharge: HOME | End: 2017-08-29

## 2017-08-29 VITALS
OXYGEN SATURATION: 98 % | DIASTOLIC BLOOD PRESSURE: 64 MMHG | RESPIRATION RATE: 16 BRPM | WEIGHT: 234 LBS | TEMPERATURE: 98.1 F | BODY MASS INDEX: 38.99 KG/M2 | HEIGHT: 65 IN | HEART RATE: 90 BPM | SYSTOLIC BLOOD PRESSURE: 94 MMHG

## 2017-08-29 DIAGNOSIS — I10 ESSENTIAL HYPERTENSION: ICD-10-CM

## 2017-08-29 DIAGNOSIS — M25.511 CHRONIC RIGHT SHOULDER PAIN: Primary | ICD-10-CM

## 2017-08-29 DIAGNOSIS — Z01.818 PRE-OP EXAM: ICD-10-CM

## 2017-08-29 DIAGNOSIS — G89.29 CHRONIC RIGHT SHOULDER PAIN: Primary | ICD-10-CM

## 2017-08-29 PROCEDURE — 99001 SPECIMEN HANDLING PT-LAB: CPT | Performed by: INTERNAL MEDICINE

## 2017-08-29 RX ORDER — LISINOPRIL 20 MG/1
20 TABLET ORAL DAILY
Qty: 30 TAB | Refills: 4 | Status: SHIPPED | OUTPATIENT
Start: 2017-08-29 | End: 2018-03-22 | Stop reason: SDUPTHER

## 2017-08-29 NOTE — PROGRESS NOTES
Víctor Austin is a 50 y.o. female here today for pre-op visit. 1. Have you been to the ER, urgent care clinic since your last visit? Hospitalized since your last visit? No    2. Have you seen or consulted any other health care providers outside of the [address] [place] [address] since your last visit? Include any pap smears or colon screening.  Yes Reason for visit: Marco Oakley

## 2017-08-29 NOTE — MR AVS SNAPSHOT
Visit Information Date & Time Provider Department Dept. Phone Encounter #  
 8/29/2017  2:00 PM Soraida Duarte, [address] [phone] [id number] Upcoming Health Maintenance Date Due INFLUENZA AGE 9 TO ADULT 8/1/2017 DTaP/Tdap/Td series (2 - Td) 7/31/2025 Allergies as of 8/29/2017  Review Complete On: 8/29/2017 By: Emil Weinstein LPN Severity Noted Reaction Type Reactions Codeine Medium 07/06/2017    Hives Hydrocodone Morphine  03/20/2017    Myalgia Simvastatin  03/20/2017    Other (comments) Severe muscle cramps Current Immunizations  Reviewed on 9/8/2016 Name Date Influenza Vaccine 10/15/2015 Influenza Vaccine (Quad) PF 9/8/2016 Influenza Vaccine Split 10/18/2012 Tdap 7/31/2015 Not reviewed this visit You Were Diagnosed With   
  
 Codes Comments Chronic right shoulder pain    -  Primary ICD-10-CM: M25.511, G89.29 ICD-9-CM: 719.41, 338.29 Pre-op exam     ICD-10-CM: L49.680 ICD-9-CM: V72.84 Essential hypertension     ICD-10-CM: I10 
ICD-9-CM: 401.9 Vitals BP Pulse Temp Resp Height(growth percentile) Weight(growth percentile) 94/64 90 98.1 °F (36.7 °C) 16 5' 5\" (1.651 m) 234 lb (106.1 kg) SpO2 BMI OB Status Smoking Status 98% 38.94 kg/m2 Hysterectomy Never Smoker Vitals History BMI and BSA Data Body Mass Index Body Surface Area 38.94 kg/m 2 2.21 m 2 Preferred Pharmacy Pharmacy Name Phone [facility] [address] [facility] [phone] Your Updated Medication List  
  
   
This list is accurate as of: 8/29/17  2:38 PM.  Always use your most recent med list.  
  
  
  
  
 COQ10  PO Take  by mouth daily. ibuprofen 800 mg tablet Commonly known as:  MOTRIN  
800 mg.  
  
 levothyroxine 112 mcg tablet Commonly known as:  SYNTHROID  
 Take 1 Tab by mouth Daily (before breakfast). Indications: hypothyroidism  
  
 lisinopril-hydroCHLOROthiazide 20-25 mg per tablet Commonly known as:  Adonica Frankel Take 1 Tab by mouth daily. pravastatin 10 mg tablet Commonly known as:  PRAVACHOL Take 1 Tab by mouth nightly. VITAMIN D3 1,000 unit Cap Generic drug:  cholecalciferol Take  by mouth daily. We Performed the Following AMB POC EKG ROUTINE W/ 12 LEADS, INTER & REP [20203 CPT(R)] To-Do List   
 08/29/2017 Lab:  CBC WITH AUTOMATED DIFF   
  
 08/29/2017 Lab:  METABOLIC PANEL, BASIC   
  
 08/29/2017 Lab:  PROTHROMBIN TIME + INR   
  
 08/29/2017 Lab:  PTT   
  
 08/29/2017 Imaging:  XR CHEST PA LAT Patient Instructions High Blood Pressure: Care Instructions Your Care Instructions If your blood pressure is usually above 140/90, you have high blood pressure, or hypertension. That means the top number is 140 or higher or the bottom number is 90 or higher, or both. Despite what a lot of people think, high blood pressure usually doesn't cause headaches or make you feel dizzy or lightheaded. It usually has no symptoms. But it does increase your risk for heart attack, stroke, and kidney or eye damage. The higher your blood pressure, the more your risk increases. Your doctor will give you a goal for your blood pressure. Your goal will be based on your health and your age. An example of a goal is to keep your blood pressure below 140/90. Lifestyle changes, such as eating healthy and being active, are always important to help lower blood pressure. You might also take medicine to reach your blood pressure goal. 
Follow-up care is a key part of your treatment and safety. Be sure to make and go to all appointments, and call your doctor if you are having problems. It's also a good idea to know your test results and keep a list of the medicines you take. How can you care for yourself at home? Medical treatment · If you stop taking your medicine, your blood pressure will go back up. You may take one or more types of medicine to lower your blood pressure. Be safe with medicines. Take your medicine exactly as prescribed. Call your doctor if you think you are having a problem with your medicine. · Talk to your doctor before you start taking aspirin every day. Aspirin can help certain people lower their risk of a heart attack or stroke. But taking aspirin isn't right for everyone, because it can cause serious bleeding. · See your doctor regularly. You may need to see the doctor more often at first or until your blood pressure comes down. · If you are taking blood pressure medicine, talk to your doctor before you take decongestants or anti-inflammatory medicine, such as ibuprofen. Some of these medicines can raise blood pressure. · Learn how to check your blood pressure at home. Lifestyle changes · Stay at a healthy weight. This is especially important if you put on weight around the waist. Losing even 10 pounds can help you lower your blood pressure. · If your doctor recommends it, get more exercise. Walking is a good choice. Bit by bit, increase the amount you walk every day. Try for at least 30 minutes on most days of the week. You also may want to swim, bike, or do other activities. · Avoid or limit alcohol. Talk to your doctor about whether you can drink any alcohol. · Try to limit how much sodium you eat to less than 2,300 milligrams (mg) a day. Your doctor may ask you to try to eat less than 1,500 mg a day. · Eat plenty of fruits (such as bananas and oranges), vegetables, legumes, whole grains, and low-fat dairy products. · Lower the amount of saturated fat in your diet. Saturated fat is found in animal products such as milk, cheese, and meat. Limiting these foods may help you lose weight and also lower your risk for heart disease. · Do not smoke. Smoking increases your risk for heart attack and stroke. If you need help quitting, talk to your doctor about stop-smoking programs and medicines. These can increase your chances of quitting for good. When should you call for help? Call 911 anytime you think you may need emergency care. This may mean having symptoms that suggest that your blood pressure is causing a serious heart or blood vessel problem. Your blood pressure may be over 180/110. For example, call 911 if: 
· You have symptoms of a heart attack. These may include: ¨ Chest pain or pressure, or a strange feeling in the chest. 
¨ Sweating. ¨ Shortness of breath. ¨ Nausea or vomiting. ¨ Pain, pressure, or a strange feeling in the back, neck, jaw, or upper belly or in one or both shoulders or arms. ¨ Lightheadedness or sudden weakness. ¨ A fast or irregular heartbeat. · You have symptoms of a stroke. These may include: 
¨ Sudden numbness, tingling, weakness, or loss of movement in your face, arm, or leg, especially on only one side of your body. ¨ Sudden vision changes. ¨ Sudden trouble speaking. ¨ Sudden confusion or trouble understanding simple statements. ¨ Sudden problems with walking or balance. ¨ A sudden, severe headache that is different from past headaches. · You have severe back or belly pain. Do not wait until your blood pressure comes down on its own. Get help right away. Call your doctor now or seek immediate care if: 
· Your blood pressure is much higher than normal (such as 180/110 or higher), but you don't have symptoms. · You think high blood pressure is causing symptoms, such as: ¨ Severe headache. ¨ Blurry vision. Watch closely for changes in your health, and be sure to contact your doctor if: 
· Your blood pressure measures 140/90 or higher at least 2 times. That means the top number is 140 or higher or the bottom number is 90 or higher, or both. · You think you may be having side effects from your blood pressure medicine. · Your blood pressure is usually normal, but it goes above normal at least 2 times. Where can you learn more? Go to http://yoel-osvaldo.info/. Enter O516 in the search box to learn more about \"High Blood Pressure: Care Instructions. \" Current as of: August 8, 2016 Content Version: 11.3 © 1153-7322 [employer]. Care instructions adapted under license by [employer] (which disclaims liability or warranty for this information). If you have questions about a medical condition or this instruction, always ask your healthcare professional. [place] [address] any warranty or liability for your use of this information. Introducing [facility] & HEALTH SERVICES! Dear Sommer Flores: Thank you for requesting a EnterMedia account. Our records indicate that you already have an active EnterMedia account. You can access your account anytime at [email or web address] Did you know that you can access your hospital and ER discharge instructions at any time in EnterMedia? You can also review all of your test results from your hospital stay or ER visit. Additional Information If you have questions, please visit the Frequently Asked Questions section of the EnterMedia website at [email or web address] Remember, EnterMedia is NOT to be used for urgent needs. For medical emergencies, dial 911. Now available from your iPhone and Android! Please provide this summary of care documentation to your next provider. Your primary care clinician is listed as Jacobu 51. If you have any questions after today's visit, please call [phone].

## 2017-08-29 NOTE — PATIENT INSTRUCTIONS

## 2017-08-30 LAB
APTT PPP: 26 SEC (ref 24–33)
BASOPHILS # BLD AUTO: 0.1 X10E3/UL (ref 0–0.2)
BASOPHILS NFR BLD AUTO: 1 %
BUN SERPL-MCNC: 12 MG/DL (ref 6–24)
BUN/CREAT SERPL: 15 (ref 9–23)
CALCIUM SERPL-MCNC: 9.7 MG/DL (ref 8.7–10.2)
CHLORIDE SERPL-SCNC: 98 MMOL/L (ref 96–106)
CO2 SERPL-SCNC: 30 MMOL/L (ref 18–29)
CREAT SERPL-MCNC: 0.8 MG/DL (ref 0.57–1)
EOSINOPHIL # BLD AUTO: 0.2 X10E3/UL (ref 0–0.4)
EOSINOPHIL NFR BLD AUTO: 2 %
ERYTHROCYTE [DISTWIDTH] IN BLOOD BY AUTOMATED COUNT: 13.5 % (ref 12.3–15.4)
GLUCOSE SERPL-MCNC: 119 MG/DL (ref 65–99)
HCT VFR BLD AUTO: 39.3 % (ref 34–46.6)
HGB BLD-MCNC: 13.2 G/DL (ref 11.1–15.9)
IMM GRANULOCYTES # BLD: 0 X10E3/UL (ref 0–0.1)
IMM GRANULOCYTES NFR BLD: 0 %
INR PPP: 0.9 (ref 0.8–1.2)
LYMPHOCYTES # BLD AUTO: 3.8 X10E3/UL (ref 0.7–3.1)
LYMPHOCYTES NFR BLD AUTO: 41 %
MCH RBC QN AUTO: 30.6 PG (ref 26.6–33)
MCHC RBC AUTO-ENTMCNC: 33.6 G/DL (ref 31.5–35.7)
MCV RBC AUTO: 91 FL (ref 79–97)
MONOCYTES # BLD AUTO: 0.5 X10E3/UL (ref 0.1–0.9)
MONOCYTES NFR BLD AUTO: 6 %
NEUTROPHILS # BLD AUTO: 4.6 X10E3/UL (ref 1.4–7)
NEUTROPHILS NFR BLD AUTO: 50 %
PLATELET # BLD AUTO: 306 X10E3/UL (ref 150–379)
POTASSIUM SERPL-SCNC: 4.4 MMOL/L (ref 3.5–5.2)
PROTHROMBIN TIME: 9.7 SEC (ref 9.1–12)
RBC # BLD AUTO: 4.32 X10E6/UL (ref 3.77–5.28)
SODIUM SERPL-SCNC: 141 MMOL/L (ref 134–144)
WBC # BLD AUTO: 9.3 X10E3/UL (ref 3.4–10.8)

## 2017-11-02 ENCOUNTER — OFFICE VISIT (OUTPATIENT)
Dept: FAMILY MEDICINE CLINIC | Age: 47
End: 2017-11-02

## 2017-11-02 VITALS
RESPIRATION RATE: 20 BRPM | OXYGEN SATURATION: 97 % | WEIGHT: 235 LBS | BODY MASS INDEX: 39.15 KG/M2 | TEMPERATURE: 98 F | HEIGHT: 65 IN | DIASTOLIC BLOOD PRESSURE: 78 MMHG | HEART RATE: 87 BPM | SYSTOLIC BLOOD PRESSURE: 118 MMHG

## 2017-11-02 DIAGNOSIS — J02.9 VIRAL PHARYNGITIS: Primary | ICD-10-CM

## 2017-11-02 DIAGNOSIS — J06.9 VIRAL UPPER RESPIRATORY TRACT INFECTION: ICD-10-CM

## 2017-11-02 LAB
S PYO AG THROAT QL: NEGATIVE
VALID INTERNAL CONTROL?: YES

## 2017-11-02 NOTE — MR AVS SNAPSHOT
Visit Information Date & Time Provider Department Dept. Phone Encounter #  
 11/2/2017  2:40 [phone] [id number] Follow-up Instructions Return if symptoms worsen or fail to improve. Upcoming Health Maintenance Date Due INFLUENZA AGE 9 TO ADULT 8/1/2017 DTaP/Tdap/Td series (2 - Td) 7/31/2025 Allergies as of 11/2/2017  Review Complete On: 11/2/2017 By: Nani Tan NP Severity Noted Reaction Type Reactions Codeine Medium 07/06/2017    Hives Hydrocodone Morphine  03/20/2017    Myalgia Simvastatin  03/20/2017    Other (comments) Severe muscle cramps Current Immunizations  Reviewed on 9/8/2016 Name Date Influenza Vaccine 10/15/2015 Influenza Vaccine (Quad) PF 9/8/2016 Influenza Vaccine Split 10/18/2012 Tdap 7/31/2015 Not reviewed this visit You Were Diagnosed With   
  
 Codes Comments Viral pharyngitis    -  Primary ICD-10-CM: J02.9 ICD-9-CM: 628 Viral upper respiratory tract infection     ICD-10-CM: J06.9, B97.89 ICD-9-CM: 465.9 Vitals BP Pulse Temp Resp Height(growth percentile) Weight(growth percentile) 118/78 (BP 1 Location: Left arm, BP Patient Position: Sitting) 87 98 °F (36.7 °C) (Oral) 20 5' 5\" (1.651 m) 235 lb (106.6 kg) SpO2 BMI OB Status Smoking Status 97% 39.11 kg/m2 Hysterectomy Never Smoker BMI and BSA Data Body Mass Index Body Surface Area  
 39.11 kg/m 2 2.21 m 2 Preferred Pharmacy Pharmacy Name Phone [facility] [address] [phone] [facility] [phone] Your Updated Medication List  
  
   
This list is accurate as of: 11/2/17  3:05 PM.  Always use your most recent med list.  
  
  
  
  
 COQ10  PO Take  by mouth daily. ibuprofen 800 mg tablet Commonly known as:  MOTRIN  
800 mg.  
  
 levothyroxine 112 mcg tablet Commonly known as:  SYNTHROID  
 Take 1 Tab by mouth Daily (before breakfast). Indications: hypothyroidism  
  
 lisinopril 20 mg tablet Commonly known as:  Nikia Reasons Take 1 Tab by mouth daily. pravastatin 10 mg tablet Commonly known as:  PRAVACHOL  
TAKE ONE TABLET BY MOUTH NIGHTLY  
  
 VITAMIN D3 1,000 unit Cap Generic drug:  cholecalciferol Take  by mouth daily. We Performed the Following AMB POC RAPID STREP A [37091 CPT(R)] Follow-up Instructions Return if symptoms worsen or fail to improve. Patient Instructions Upper Respiratory Infection (Cold): Care Instructions Your Care Instructions An upper respiratory infection, or URI, is an infection of the nose, sinuses, or throat. URIs are spread by coughs, sneezes, and direct contact. The common cold is the most frequent kind of URI. The flu and sinus infections are other kinds of URIs. Almost all URIs are caused by viruses. Antibiotics won't cure them. But you can treat most infections with home care. This may include drinking lots of fluids and taking over-the-counter pain medicine. You will probably feel better in 4 to 10 days. The doctor has checked you carefully, but problems can develop later. If you notice any problems or new symptoms, get medical treatment right away. Follow-up care is a key part of your treatment and safety. Be sure to make and go to all appointments, and call your doctor if you are having problems. It's also a good idea to know your test results and keep a list of the medicines you take. How can you care for yourself at home? · To prevent dehydration, drink plenty of fluids, enough so that your urine is light yellow or clear like water. Choose water and other caffeine-free clear liquids until you feel better. If you have kidney, heart, or liver disease and have to limit fluids, talk with your doctor before you increase the amount of fluids you drink. · Take an over-the-counter pain medicine, such as acetaminophen (Tylenol), ibuprofen (Advil, Motrin), or naproxen (Aleve). Read and follow all instructions on the label. · Before you use cough and cold medicines, check the label. These medicines may not be safe for young children or for people with certain health problems. · Be careful when taking over-the-counter cold or flu medicines and Tylenol at the same time. Many of these medicines have acetaminophen, which is Tylenol. Read the labels to make sure that you are not taking more than the recommended dose. Too much acetaminophen (Tylenol) can be harmful. · Get plenty of rest. 
· Do not smoke or allow others to smoke around you. If you need help quitting, talk to your doctor about stop-smoking programs and medicines. These can increase your chances of quitting for good. When should you call for help? Call 911 anytime you think you may need emergency care. For example, call if: 
? · You have severe trouble breathing. ?Call your doctor now or seek immediate medical care if: 
? · You seem to be getting much sicker. ? · You have new or worse trouble breathing. ? · You have a new or higher fever. ? · You have a new rash. ? Watch closely for changes in your health, and be sure to contact your doctor if: 
? · You have a new symptom, such as a sore throat, an earache, or sinus pain. ? · You cough more deeply or more often, especially if you notice more mucus or a change in the color of your mucus. ? · You do not get better as expected. Where can you learn more? Go to http://yoel-osvaldo.info/. Enter H572 in the search box to learn more about \"Upper Respiratory Infection (Cold): Care Instructions. \" Current as of: May 12, 2017 Content Version: 11.4 © 6439-2280 Healthwise, [employer].  Care instructions adapted under license by [employer] (which disclaims liability or warranty for this information). If you have questions about a medical condition or this instruction, always ask your healthcare professional. [address] any warranty or liability for your use of this information. Introducing [facility]! Dear Agus Ratliff: Thank you for requesting a Kingmaker account. Our records indicate that you already have an active Kingmaker account. You can access your account anytime at [email or web address] Services (EPS)/[employer] Did you know that you can access your hospital and ER discharge instructions at any time in Kingmaker? You can also review all of your test results from your hospital stay or ER visit. Additional Information If you have questions, please visit the Frequently Asked Questions section of the Kingmaker website at [email or web address] Remember, Kingmaker is NOT to be used for urgent needs. For medical emergencies, dial 911. Now available from your iPhone and Android! Please provide this summary of care documentation to your next provider. Your primary care clinician is listed as Fredy 51. If you have any questions after today's visit, please call [phone].

## 2017-11-02 NOTE — PATIENT INSTRUCTIONS
Upper Respiratory Infection (Cold): Care Instructions  Your Care Instructions    An upper respiratory infection, or URI, is an infection of the nose, sinuses, or throat. URIs are spread by coughs, sneezes, and direct contact. The common cold is the most frequent kind of URI. The flu and sinus infections are other kinds of URIs. Almost all URIs are caused by viruses. Antibiotics won't cure them. But you can treat most infections with home care. This may include drinking lots of fluids and taking over-the-counter pain medicine. You will probably feel better in 4 to 10 days. The doctor has checked you carefully, but problems can develop later. If you notice any problems or new symptoms, get medical treatment right away. Follow-up care is a key part of your treatment and safety. Be sure to make and go to all appointments, and call your doctor if you are having problems. It's also a good idea to know your test results and keep a list of the medicines you take. How can you care for yourself at home? · To prevent dehydration, drink plenty of fluids, enough so that your urine is light yellow or clear like water. Choose water and other caffeine-free clear liquids until you feel better. If you have kidney, heart, or liver disease and have to limit fluids, talk with your doctor before you increase the amount of fluids you drink. · Take an over-the-counter pain medicine, such as acetaminophen (Tylenol), ibuprofen (Advil, Motrin), or naproxen (Aleve). Read and follow all instructions on the label. · Before you use cough and cold medicines, check the label. These medicines may not be safe for young children or for people with certain health problems. · Be careful when taking over-the-counter cold or flu medicines and Tylenol at the same time. Many of these medicines have acetaminophen, which is Tylenol. Read the labels to make sure that you are not taking more than the recommended dose.  Too much acetaminophen (Tylenol) can be harmful. · Get plenty of rest.  · Do not smoke or allow others to smoke around you. If you need help quitting, talk to your doctor about stop-smoking programs and medicines. These can increase your chances of quitting for good. When should you call for help? Call 911 anytime you think you may need emergency care. For example, call if:  ? · You have severe trouble breathing. ?Call your doctor now or seek immediate medical care if:  ? · You seem to be getting much sicker. ? · You have new or worse trouble breathing. ? · You have a new or higher fever. ? · You have a new rash. ? Watch closely for changes in your health, and be sure to contact your doctor if:  ? · You have a new symptom, such as a sore throat, an earache, or sinus pain. ? · You cough more deeply or more often, especially if you notice more mucus or a change in the color of your mucus. ? · You do not get better as expected. Where can you learn more? Go to http://yoel-osvaldo.info/. Enter E227 in the search box to learn more about \"Upper Respiratory Infection (Cold): Care Instructions. \"  Current as of: May 12, 2017  Content Version: 11.4  © 7404-4026 Healthwise, Incorporated. Care instructions adapted under license by [employer] (which disclaims liability or warranty for this information). If you have questions about a medical condition or this instruction, always ask your healthcare professional. [place] [address] any warranty or liability for your use of this information.

## 2017-11-02 NOTE — PROGRESS NOTES
Subjective:      History was provided by the patient. Lewis Vargas is a 50 y.o. female who presents for evaluation of symptoms of a URI. Symptoms include  nasal congestion, sore throat, nasal blockage and post nasal drip. Onset of symptoms was 5 days ago, gradually worsening since that time. She is drinking plenty of fluids. . Evaluation to date: none. Treatment to date: decongestants, nyquil. Past Medical History:   Diagnosis Date    Endometriosis     Essential hypertension 11/9/2016    Fibromyalgia 10/21/2015         Gastroparesis 10/18/2012    GERD (gastroesophageal reflux disease) 10/18/2012    Hashimoto's thyroiditis 3/20/2012    Hiatal hernia 3/28/2016    Hyperlipidemia 3/20/2012    Hypothyroidism 3/20/2012    Hypothyroidism due to acquired atrophy of thyroid 9/8/2016    Hypovitaminosis D 1/12/2017    Nausea & vomiting      Current Outpatient Prescriptions   Medication Sig Dispense Refill    pravastatin (PRAVACHOL) 10 mg tablet TAKE ONE TABLET BY MOUTH NIGHTLY 90 Tab 1    lisinopril (PRINIVIL, ZESTRIL) 20 mg tablet Take 1 Tab by mouth daily. 30 Tab 4    levothyroxine (SYNTHROID) 112 mcg tablet Take 1 Tab by mouth Daily (before breakfast). Indications: hypothyroidism 90 Tab 1    UBIDECARENONE/VITAMIN E MIXED (COQ10  PO) Take  by mouth daily.  ibuprofen (MOTRIN) 800 mg tablet 800 mg.  cholecalciferol (VITAMIN D3) 1,000 unit cap Take  by mouth daily. Allergies   Allergen Reactions    Codeine Hives     Hydrocodone      Morphine Myalgia    Simvastatin Other (comments)     Severe muscle cramps     Social History     Social History    Marital status:      Spouse name: N/A    Number of children: N/A    Years of education: N/A     Occupational History    Not on file.      Social History Main Topics    Smoking status: Never Smoker    Smokeless tobacco: Never Used    Alcohol use No    Drug use: No    Sexual activity: Not Currently     Other Topics Concern    Not on file     Social History Narrative     Review of Systems  A comprehensive review of systems was negative except for that written in the HPI. Objective:     Visit Vitals    /78 (BP 1 Location: Left arm, BP Patient Position: Sitting)    Pulse 87    Temp 98 °F (36.7 °C) (Oral)    Resp 20    Ht 5' 5\" (1.651 m)    Wt 235 lb (106.6 kg)    SpO2 97%    BMI 39.11 kg/m2     General:  alert, cooperative, no distress, appears stated age   Head:  NCAT w/o lesions or tenderness   Eyes: negative   Ears: abnormal TM AD - bulging   Sinus tender: negative   Mouth:  Lips, mucosa, and tongue normal. Teeth and gums normal   Neck: supple, symmetrical, trachea midline, mild anterior cervical adenopathy, thyroid: not enlarged, symmetric, no tenderness/mass/nodules, no carotid bruit and no JVD. Lungs: clear to auscultation bilaterally   Abdomen: soft, non-tender. Bowel sounds normal. No masses,  no organomegaly        Assessment:     viral upper respiratory illness and viral pharyngitis    Plan:   Discussed dx and tx of URIs  Discussed the importance of avoiding unnecessary abx therapy. Suggested symptomatic OTC remedies. Nasal saline sprays for congestion. RTC prn.

## 2017-11-02 NOTE — PROGRESS NOTES
Enrrique Figueroa is a 50 y.o. female (: 1970) presenting to address:cough, congestion, sore throat    Chief Complaint   Patient presents with    Cough     x5 days     Sore Throat    Nasal Congestion       Vitals:    17 1446   BP: 118/78   Pulse: 87   Resp: 20   Temp: 98 °F (36.7 °C)   TempSrc: Oral   SpO2: 97%   Weight: 235 lb (106.6 kg)   Height: 5' 5\" (1.651 m)   PainSc:   8   PainLoc: Head       Hearing/Vision:   No exam data present    Learning Assessment:     Learning Assessment 10/21/2015   PRIMARY LEARNER Patient   HIGHEST LEVEL OF EDUCATION - PRIMARY LEARNER  SOME COLLEGE   BARRIERS PRIMARY LEARNER NONE   CO-LEARNER CAREGIVER No   PRIMARY LANGUAGE ENGLISH   LEARNER PREFERENCE PRIMARY LISTENING   ANSWERED BY self   RELATIONSHIP SELF     Depression Screening:     PHQ over the last two weeks 2017   Little interest or pleasure in doing things Several days   Feeling down, depressed or hopeless Several days   Total Score PHQ 2 2     Fall Risk Assessment:   No flowsheet data found. Abuse Screening:     Abuse Screening Questionnaire 2014   Do you ever feel afraid of your partner? N   Are you in a relationship with someone who physically or mentally threatens you? N   Is it safe for you to go home? Y     Coordination of Care Questionaire:   1. Have you been to the ER, urgent care clinic since your last visit? Hospitalized since your last visit? no    2. Have you seen or consulted any other health care providers outside of the [address] [place] [address] since your last visit? Include any pap smears or colon screening. Ortho Dr. Jakob Morton    Advanced Directive:   1. Do you have an Advanced Directive? no    2. Would you like information on Advanced Directives? no    Patient has already received flu vaccine.

## 2018-02-02 ENCOUNTER — OFFICE VISIT (OUTPATIENT)
Dept: FAMILY MEDICINE CLINIC | Age: 48
End: 2018-02-02

## 2018-02-02 VITALS
DIASTOLIC BLOOD PRESSURE: 76 MMHG | TEMPERATURE: 97.9 F | HEART RATE: 89 BPM | OXYGEN SATURATION: 98 % | RESPIRATION RATE: 18 BRPM | BODY MASS INDEX: 38.65 KG/M2 | WEIGHT: 232 LBS | HEIGHT: 65 IN | SYSTOLIC BLOOD PRESSURE: 118 MMHG

## 2018-02-02 DIAGNOSIS — R05.9 COUGH: ICD-10-CM

## 2018-02-02 DIAGNOSIS — J06.9 UPPER RESPIRATORY TRACT INFECTION, UNSPECIFIED TYPE: Primary | ICD-10-CM

## 2018-02-02 RX ORDER — HYDROCODONE POLISTIREX AND CHLORPHENIRAMINE POLISTIREX 10; 8 MG/5ML; MG/5ML
5 SUSPENSION, EXTENDED RELEASE ORAL
Qty: 100 ML | Refills: 0 | Status: SHIPPED | OUTPATIENT
Start: 2018-02-02 | End: 2018-07-02 | Stop reason: ALTCHOICE

## 2018-02-02 RX ORDER — CLARITHROMYCIN 500 MG/1
500 TABLET, FILM COATED ORAL 2 TIMES DAILY
Qty: 20 TAB | Refills: 0 | Status: SHIPPED | OUTPATIENT
Start: 2018-02-02 | End: 2018-02-12

## 2018-02-02 NOTE — PATIENT INSTRUCTIONS
Upper Respiratory Infection (Cold): Care Instructions  Your Care Instructions    An upper respiratory infection, or URI, is an infection of the nose, sinuses, or throat. URIs are spread by coughs, sneezes, and direct contact. The common cold is the most frequent kind of URI. The flu and sinus infections are other kinds of URIs. Almost all URIs are caused by viruses. Antibiotics won't cure them. But you can treat most infections with home care. This may include drinking lots of fluids and taking over-the-counter pain medicine. You will probably feel better in 4 to 10 days. The doctor has checked you carefully, but problems can develop later. If you notice any problems or new symptoms, get medical treatment right away. Follow-up care is a key part of your treatment and safety. Be sure to make and go to all appointments, and call your doctor if you are having problems. It's also a good idea to know your test results and keep a list of the medicines you take. How can you care for yourself at home? · To prevent dehydration, drink plenty of fluids, enough so that your urine is light yellow or clear like water. Choose water and other caffeine-free clear liquids until you feel better. If you have kidney, heart, or liver disease and have to limit fluids, talk with your doctor before you increase the amount of fluids you drink. · Take an over-the-counter pain medicine, such as acetaminophen (Tylenol), ibuprofen (Advil, Motrin), or naproxen (Aleve). Read and follow all instructions on the label. · Before you use cough and cold medicines, check the label. These medicines may not be safe for young children or for people with certain health problems. · Be careful when taking over-the-counter cold or flu medicines and Tylenol at the same time. Many of these medicines have acetaminophen, which is Tylenol. Read the labels to make sure that you are not taking more than the recommended dose.  Too much acetaminophen (Tylenol) can be harmful. · Get plenty of rest.  · Do not smoke or allow others to smoke around you. If you need help quitting, talk to your doctor about stop-smoking programs and medicines. These can increase your chances of quitting for good. When should you call for help? Call 911 anytime you think you may need emergency care. For example, call if:  ? · You have severe trouble breathing. ?Call your doctor now or seek immediate medical care if:  ? · You seem to be getting much sicker. ? · You have new or worse trouble breathing. ? · You have a new or higher fever. ? · You have a new rash. ? Watch closely for changes in your health, and be sure to contact your doctor if:  ? · You have a new symptom, such as a sore throat, an earache, or sinus pain. ? · You cough more deeply or more often, especially if you notice more mucus or a change in the color of your mucus. ? · You do not get better as expected. Where can you learn more? Go to http://yoel-osvaldo.info/. Enter H552 in the search box to learn more about \"Upper Respiratory Infection (Cold): Care Instructions. \"  Current as of: May 12, 2017  Content Version: 11.4  © 1880-4624 Healthwise, Incorporated. Care instructions adapted under license by [employer] (which disclaims liability or warranty for this information). If you have questions about a medical condition or this instruction, always ask your healthcare professional. [place] [address] any warranty or liability for your use of this information.

## 2018-02-02 NOTE — PROGRESS NOTES
Chelsea Muhammad is a 50 y.o. female is here for cold symptoms, and general follow up. 1. Have you been to the ER, urgent care clinic since your last visit? Hospitalized since your last visit? No    2. Have you seen or consulted any other health care providers outside of the [address] [place] [address] since your last visit? Include any pap smears or colon screening. ob/gyn     Health Maintenance Due   Topic Date Due    Influenza Age 5 to Adult  08/01/2017     Already got flu shot.

## 2018-02-02 NOTE — MR AVS SNAPSHOT
[address] [facility], [place], operated by [facility] 
 
 
 [address] [facility] [id number] [phone] Patient: Radha Rangel 
MRN: [id number] HLW:[id number]/[id number] Visit Information Date & Time Provider Department Dept. Phone Encounter #  
 2/2/2018  3:00 [phone] [id number] Upcoming Health Maintenance Date Due Influenza Age 5 to Adult 8/1/2017 DTaP/Tdap/Td series (2 - Td) 7/31/2025 Allergies as of 2/2/2018  Review Complete On: 2/2/2018 By: Sandra Go LPN Severity Noted Reaction Type Reactions Codeine Medium 07/06/2017    Hives Hydrocodone Morphine  03/20/2017    Myalgia, Other (comments) Headache Oxycodone  06/18/2015    Rash Simvastatin  03/20/2017    Other (comments) Leg cramps Severe muscle cramps Current Immunizations  Reviewed on 9/8/2016 Name Date Influenza Vaccine 10/15/2015 Influenza Vaccine (Quad) PF 9/8/2016 Influenza Vaccine Split 10/18/2012 Tdap 7/31/2015 Not reviewed this visit You Were Diagnosed With   
  
 Codes Comments Upper respiratory tract infection, unspecified type    -  Primary ICD-10-CM: J06.9 ICD-9-CM: 465.9 Cough     ICD-10-CM: R05 ICD-9-CM: 759. 2 Vitals BP Pulse Temp Resp Height(growth percentile) Weight(growth percentile) 118/76 (BP 1 Location: Left arm, BP Patient Position: Sitting) 89 97.9 °F (36.6 °C) (Oral) 18 5' 5\" (1.651 m) 232 lb (105.2 kg) SpO2 BMI OB Status Smoking Status 98% 38.61 kg/m2 Hysterectomy Never Smoker BMI and BSA Data Body Mass Index Body Surface Area  
 38.61 kg/m 2 2.2 m 2 Preferred Pharmacy Pharmacy Name Phone [address], [address] [facility] [phone] Your Updated Medication List  
  
   
This list is accurate as of: 2/2/18  3:31 PM.  Always use your most recent med list.  
  
  
  
  
 chlorpheniramine-HYDROcodone 10-8 mg/5 mL suspension Commonly known as:  Alli Plenty Take 5 mL by mouth nightly as needed for Cough. Max Daily Amount: 5 mL. clarithromycin 500 mg tablet Commonly known as:  Lobito Reasons Take 1 Tab by mouth two (2) times a day for 10 days. COQ10  PO Take  by mouth daily. ibuprofen 800 mg tablet Commonly known as:  MOTRIN  
800 mg every six (6) hours as needed. levothyroxine 112 mcg tablet Commonly known as:  SYNTHROID Take 1 Tab by mouth Daily (before breakfast). Indications: hypothyroidism  
  
 lisinopril 20 mg tablet Commonly known as:  Birdena Antione Take 1 Tab by mouth daily. pravastatin 10 mg tablet Commonly known as:  PRAVACHOL  
TAKE ONE TABLET BY MOUTH NIGHTLY TRI-LO-ESTARYLLA PO Take  by mouth. VITAMIN D3 1,000 unit Cap Generic drug:  cholecalciferol Take  by mouth daily. Prescriptions Printed Refills  
 chlorpheniramine-HYDROcodone (TUSSIONEX) 10-8 mg/5 mL suspension 0 Sig: Take 5 mL by mouth nightly as needed for Cough. Max Daily Amount: 5 mL. Class: Print Route: Oral  
  
Prescriptions Sent to Pharmacy Refills  
 clarithromycin (BIAXIN) 500 mg tablet 0 Sig: Take 1 Tab by mouth two (2) times a day for 10 days. Class: Normal  
 Pharmacy: [address] [place] [address], [facility] #: [phone] Route: Oral  
  
Patient Instructions Upper Respiratory Infection (Cold): Care Instructions Your Care Instructions An upper respiratory infection, or URI, is an infection of the nose, sinuses, or throat. URIs are spread by coughs, sneezes, and direct contact. The common cold is the most frequent kind of URI. The flu and sinus infections are other kinds of URIs. Almost all URIs are caused by viruses. Antibiotics won't cure them. But you can treat most infections with home care.  This may include drinking lots of fluids and taking over-the-counter pain medicine. You will probably feel better in 4 to 10 days. The doctor has checked you carefully, but problems can develop later. If you notice any problems or new symptoms, get medical treatment right away. Follow-up care is a key part of your treatment and safety. Be sure to make and go to all appointments, and call your doctor if you are having problems. It's also a good idea to know your test results and keep a list of the medicines you take. How can you care for yourself at home? · To prevent dehydration, drink plenty of fluids, enough so that your urine is light yellow or clear like water. Choose water and other caffeine-free clear liquids until you feel better. If you have kidney, heart, or liver disease and have to limit fluids, talk with your doctor before you increase the amount of fluids you drink. · Take an over-the-counter pain medicine, such as acetaminophen (Tylenol), ibuprofen (Advil, Motrin), or naproxen (Aleve). Read and follow all instructions on the label. · Before you use cough and cold medicines, check the label. These medicines may not be safe for young children or for people with certain health problems. · Be careful when taking over-the-counter cold or flu medicines and Tylenol at the same time. Many of these medicines have acetaminophen, which is Tylenol. Read the labels to make sure that you are not taking more than the recommended dose. Too much acetaminophen (Tylenol) can be harmful. · Get plenty of rest. 
· Do not smoke or allow others to smoke around you. If you need help quitting, talk to your doctor about stop-smoking programs and medicines. These can increase your chances of quitting for good. When should you call for help? Call 911 anytime you think you may need emergency care. For example, call if: 
? · You have severe trouble breathing. ?Call your doctor now or seek immediate medical care if: ? · You seem to be getting much sicker. ? · You have new or worse trouble breathing. ? · You have a new or higher fever. ? · You have a new rash. ? Watch closely for changes in your health, and be sure to contact your doctor if: 
? · You have a new symptom, such as a sore throat, an earache, or sinus pain. ? · You cough more deeply or more often, especially if you notice more mucus or a change in the color of your mucus. ? · You do not get better as expected. Where can you learn more? Go to http://yoel-osvaldo.info/. Enter Q286 in the search box to learn more about \"Upper Respiratory Infection (Cold): Care Instructions. \" Current as of: May 12, 2017 Content Version: 11.4 © 6089-1641 [employer]. Care instructions adapted under license by [employer] (which disclaims liability or warranty for this information). If you have questions about a medical condition or this instruction, always ask your healthcare professional. [place] [address] any warranty or liability for your use of this information. Introducing [facility]! Dear Lennox Grippe: Thank you for requesting a [employer] account. Our records indicate that you already have an active [employer] account. You can access your account anytime at [email or web address] Did you know that you can access your hospital and ER discharge instructions at any time in [employer]? You can also review all of your test results from your hospital stay or ER visit. Additional Information If you have questions, please visit the Frequently Asked Questions section of the [employer] website at [email or web address] Remember, [employer] is NOT to be used for urgent needs. For medical emergencies, dial 911. Now available from your iPhone and Android! Please provide this summary of care documentation to your next provider. Your primary care clinician is listed as Fredy Wallace. If you have any questions after today's visit, please call [phone].

## 2018-02-02 NOTE — PROGRESS NOTES
Assessment/Plan:    *Diagnoses and all orders for this visit:    1. Upper respiratory tract infection, unspecified type  -     clarithromycin (BIAXIN) 500 mg tablet; Take 1 Tab by mouth two (2) times a day for 10 days. 2. Cough  -     chlorpheniramine-HYDROcodone (TUSSIONEX) 10-8 mg/5 mL suspension; Take 5 mL by mouth nightly as needed for Cough. Max Daily Amount: 5 mL. Will call if not improving. Physical in July. The plan was discussed with the patient. The patient verbalized understanding and is in agreement with the plan. All medication potential side effects were discussed with the patient.    -------------------------------------------------------------------------------------------------------------------        Princess Larose is a 50 y.o. female and presents with Medication Evaluation and Cold Symptoms         Subjective:  Pt here for sinus pressure, drainage, cough, productive, colored phlegm, x 1 week. Has taken Mucinex but not seeing much improvement,\. Also has LT ear pressure. Has past hx of ear infections. ROS:  Constitutional: No recent weight change. No weakness/fatigue. No f/c. Skin: No rashes, change in nails/hair, itching   HENT: No HA, dizziness. No hearing loss/tinnitus. No nasal congestion/discharge. Eyes: No change in vision, double/blurred vision or eye pain/redness. Cardiovascular: No CP/palpitations. No GRANDE/orthopnea/PND. Respiratory: No cough/sputum, dyspnea, wheezing. Gastointestinal: No dysphagia, reflux. No n/v. No constipation/diarrhea. No melena/rectal bleeding. Genitourinary: No dysuria, urinary hesitancy, nocturia, hematuria. No incontinence. Musculoskeletal: No joint pain/stiffness. No muscle pain/tenderness. Endo: No heat/cold intolerance, no polyuria/polydypsia. Heme: No h/o anemia. No easy bleeding/bruising. Allergy/Immunology: No seasonal rhinitis. Denies frequent colds, sinus/ear infections.    Neurological: No seizures/numbness/weakness. No paresthesias. Psychiatric:  No depression, anxiety. The problem list was updated as a part of today's visit. Patient Active Problem List   Diagnosis Code    Hashimoto's thyroiditis E06.3    Hyperlipidemia E78.5    Gastroparesis K31.84    GERD (gastroesophageal reflux disease) K21.9    Fibromyalgia M79.7    Hiatal hernia K44.9    Hypothyroidism due to acquired atrophy of thyroid E03.4    Essential hypertension I10    Hypovitaminosis D E55.9       The PSH, FH were reviewed. SH:  Social History   Substance Use Topics    Smoking status: Never Smoker    Smokeless tobacco: Never Used    Alcohol use No       Medications/Allergies:  Current Outpatient Prescriptions on File Prior to Visit   Medication Sig Dispense Refill    pravastatin (PRAVACHOL) 10 mg tablet TAKE ONE TABLET BY MOUTH NIGHTLY 90 Tab 1    lisinopril (PRINIVIL, ZESTRIL) 20 mg tablet Take 1 Tab by mouth daily. 30 Tab 4    levothyroxine (SYNTHROID) 112 mcg tablet Take 1 Tab by mouth Daily (before breakfast). Indications: hypothyroidism 90 Tab 1    UBIDECARENONE/VITAMIN E MIXED (COQ10  PO) Take  by mouth daily.  ibuprofen (MOTRIN) 800 mg tablet 800 mg every six (6) hours as needed.  cholecalciferol (VITAMIN D3) 1,000 unit cap Take  by mouth daily. No current facility-administered medications on file prior to visit.          Allergies   Allergen Reactions    Codeine Hives     Hydrocodone      Morphine Myalgia and Other (comments)     Headache    Oxycodone Rash    Simvastatin Other (comments)     Leg cramps  Severe muscle cramps         Health Maintenance:   Health Maintenance   Topic Date Due    Influenza Age 5 to Adult  08/01/2017    DTaP/Tdap/Td series (2 - Td) 07/31/2025       Objective:  Visit Vitals    /76 (BP 1 Location: Left arm, BP Patient Position: Sitting)    Pulse 89    Temp 97.9 °F (36.6 °C) (Oral)    Resp 18    Ht 5' 5\" (1.651 m)    Wt 232 lb (105.2 kg)    SpO2 98%    BMI 38.61 kg/m2          Nurses notes and VS reviewed. Physical Examination: General appearance - ill-appearing  Ears - otorrhea LT ear  Nose - mucosal congestion  Mouth - erythematous  Neck - supple, no significant adenopathy  Chest - rhonchi noted scattered  Heart - normal rate, regular rhythm, normal S1, S2, no murmurs, rubs, clicks or gallops        Labwork and Ancillary Studies:    CBC w/Diff  Lab Results   Component Value Date/Time    WBC 9.3 08/29/2017 02:58 PM    HGB 13.2 08/29/2017 02:58 PM    PLATELET 028 13/34/4996 02:58 PM         Basic Metabolic Profile  Lab Results   Component Value Date/Time    Sodium 141 08/29/2017 02:58 PM    Potassium 4.4 08/29/2017 02:58 PM    Chloride 98 08/29/2017 02:58 PM    CO2 30 08/29/2017 02:58 PM    Glucose 119 08/29/2017 02:58 PM    BUN 12 08/29/2017 02:58 PM    Creatinine 0.80 08/29/2017 02:58 PM    BUN/Creatinine ratio 15 08/29/2017 02:58 PM    GFR est  08/29/2017 02:58 PM    GFR est non-AA 87 08/29/2017 02:58 PM    Calcium 9.7 08/29/2017 02:58 PM         LFT  Lab Results   Component Value Date/Time    ALT (SGPT) 14 07/06/2017 08:27 AM    AST (SGOT) 10 07/06/2017 08:27 AM    Alk.  phosphatase 84 07/06/2017 08:27 AM    Bilirubin, direct 0.08 07/06/2017 08:27 AM    Bilirubin, total 0.3 07/06/2017 08:27 AM         Cholesterol  Lab Results   Component Value Date/Time    Cholesterol, total 188 07/06/2017 08:27 AM    HDL Cholesterol 58 07/06/2017 08:27 AM    LDL, calculated 114 07/06/2017 08:27 AM    Triglyceride 82 07/06/2017 08:27 AM

## 2018-05-11 DIAGNOSIS — Z00.00 ANNUAL PHYSICAL EXAM: Primary | ICD-10-CM

## 2018-06-27 ENCOUNTER — HOSPITAL ENCOUNTER (OUTPATIENT)
Dept: LAB | Age: 48
Discharge: HOME | End: 2018-06-27

## 2018-06-27 PROCEDURE — 99001 SPECIMEN HANDLING PT-LAB: CPT | Performed by: INTERNAL MEDICINE

## 2018-06-28 LAB
25(OH)D3+25(OH)D2 SERPL-MCNC: 35.8 NG/ML (ref 30–100)
ALBUMIN SERPL-MCNC: 4.5 G/DL (ref 3.5–5.5)
ALP SERPL-CCNC: 81 IU/L (ref 39–117)
ALT SERPL-CCNC: 18 IU/L (ref 0–32)
APPEARANCE UR: ABNORMAL
AST SERPL-CCNC: 15 IU/L (ref 0–40)
BASOPHILS # BLD AUTO: 0.1 X10E3/UL (ref 0–0.2)
BASOPHILS NFR BLD AUTO: 1 %
BILIRUB DIRECT SERPL-MCNC: 0.05 MG/DL (ref 0–0.4)
BILIRUB SERPL-MCNC: <0.2 MG/DL (ref 0–1.2)
BILIRUB UR QL STRIP: NEGATIVE
BUN SERPL-MCNC: 14 MG/DL (ref 6–24)
BUN/CREAT SERPL: 15 (ref 9–23)
CALCIUM SERPL-MCNC: 9.8 MG/DL (ref 8.7–10.2)
CHLORIDE SERPL-SCNC: 102 MMOL/L (ref 96–106)
CHOLEST SERPL-MCNC: 174 MG/DL (ref 100–199)
CO2 SERPL-SCNC: 21 MMOL/L (ref 20–29)
COLOR UR: YELLOW
CREAT SERPL-MCNC: 0.91 MG/DL (ref 0.57–1)
EOSINOPHIL # BLD AUTO: 0.2 X10E3/UL (ref 0–0.4)
EOSINOPHIL NFR BLD AUTO: 2 %
ERYTHROCYTE [DISTWIDTH] IN BLOOD BY AUTOMATED COUNT: 14.1 % (ref 12.3–15.4)
GLUCOSE SERPL-MCNC: 103 MG/DL (ref 65–99)
GLUCOSE UR QL: NEGATIVE
HCT VFR BLD AUTO: 42.6 % (ref 34–46.6)
HDLC SERPL-MCNC: 48 MG/DL
HGB BLD-MCNC: 13.8 G/DL (ref 11.1–15.9)
HGB UR QL STRIP: NEGATIVE
IMM GRANULOCYTES # BLD: 0 X10E3/UL (ref 0–0.1)
IMM GRANULOCYTES NFR BLD: 0 %
INTERPRETATION, 910389: NORMAL
KETONES UR QL STRIP: NEGATIVE
LDLC SERPL CALC-MCNC: 107 MG/DL (ref 0–99)
LEUKOCYTE ESTERASE UR QL STRIP: NEGATIVE
LYMPHOCYTES # BLD AUTO: 3.6 X10E3/UL (ref 0.7–3.1)
LYMPHOCYTES NFR BLD AUTO: 37 %
MCH RBC QN AUTO: 29.3 PG (ref 26.6–33)
MCHC RBC AUTO-ENTMCNC: 32.4 G/DL (ref 31.5–35.7)
MCV RBC AUTO: 90 FL (ref 79–97)
MICRO URNS: ABNORMAL
MONOCYTES # BLD AUTO: 0.4 X10E3/UL (ref 0.1–0.9)
MONOCYTES NFR BLD AUTO: 4 %
NEUTROPHILS # BLD AUTO: 5.4 X10E3/UL (ref 1.4–7)
NEUTROPHILS NFR BLD AUTO: 56 %
NITRITE UR QL STRIP: NEGATIVE
PH UR STRIP: 8 [PH] (ref 5–7.5)
PLATELET # BLD AUTO: 303 X10E3/UL (ref 150–379)
POTASSIUM SERPL-SCNC: 5 MMOL/L (ref 3.5–5.2)
PROT SERPL-MCNC: 7 G/DL (ref 6–8.5)
PROT UR QL STRIP: NEGATIVE
RBC # BLD AUTO: 4.71 X10E6/UL (ref 3.77–5.28)
SODIUM SERPL-SCNC: 143 MMOL/L (ref 134–144)
SP GR UR: 1.02 (ref 1–1.03)
T4 FREE SERPL-MCNC: 1.37 NG/DL (ref 0.82–1.77)
TRIGL SERPL-MCNC: 95 MG/DL (ref 0–149)
TSH SERPL DL<=0.005 MIU/L-ACNC: 5.26 UIU/ML (ref 0.45–4.5)
UROBILINOGEN UR STRIP-MCNC: 0.2 MG/DL (ref 0.2–1)
VLDLC SERPL CALC-MCNC: 19 MG/DL (ref 5–40)
WBC # BLD AUTO: 9.7 X10E3/UL (ref 3.4–10.8)

## 2018-07-02 ENCOUNTER — OFFICE VISIT (OUTPATIENT)
Dept: FAMILY MEDICINE CLINIC | Age: 48
End: 2018-07-02

## 2018-07-02 VITALS
RESPIRATION RATE: 16 BRPM | TEMPERATURE: 98.8 F | BODY MASS INDEX: 37.61 KG/M2 | HEIGHT: 66 IN | HEART RATE: 88 BPM | DIASTOLIC BLOOD PRESSURE: 72 MMHG | WEIGHT: 234 LBS | OXYGEN SATURATION: 97 % | SYSTOLIC BLOOD PRESSURE: 114 MMHG

## 2018-07-02 DIAGNOSIS — E78.00 PURE HYPERCHOLESTEROLEMIA: ICD-10-CM

## 2018-07-02 DIAGNOSIS — Z00.00 ANNUAL PHYSICAL EXAM: Primary | ICD-10-CM

## 2018-07-02 DIAGNOSIS — E66.01 SEVERE OBESITY (BMI 35.0-39.9): ICD-10-CM

## 2018-07-02 DIAGNOSIS — I10 ESSENTIAL HYPERTENSION: ICD-10-CM

## 2018-07-02 DIAGNOSIS — F33.2 SEVERE EPISODE OF RECURRENT MAJOR DEPRESSIVE DISORDER, WITHOUT PSYCHOTIC FEATURES (HCC): ICD-10-CM

## 2018-07-02 DIAGNOSIS — R92.1 BREAST CALCIFICATION, RIGHT: ICD-10-CM

## 2018-07-02 PROBLEM — F33.9 EPISODE OF RECURRENT MAJOR DEPRESSIVE DISORDER (HCC): Status: ACTIVE | Noted: 2018-07-02

## 2018-07-02 RX ORDER — VENLAFAXINE HYDROCHLORIDE 75 MG/1
75 CAPSULE, EXTENDED RELEASE ORAL DAILY
Qty: 90 CAP | Refills: 0 | Status: SHIPPED | OUTPATIENT
Start: 2018-07-02 | End: 2018-07-30 | Stop reason: SINTOL

## 2018-07-02 NOTE — PROGRESS NOTES
Rosette Sanabria is a 46 y.o. female (: 1970) presenting to address:    Chief Complaint   Patient presents with    Complete Physical       Vitals:    18 1318   BP: 114/72   Pulse: 88   Resp: 16   Temp: 98.8 °F (37.1 °C)   TempSrc: Oral   SpO2: 97%   Weight: 234 lb (106.1 kg)   Height: 5' 5.67\" (1.668 m)   PainSc:   0 - No pain       Hearing/Vision:      Visual Acuity Screening    Right eye Left eye Both eyes   Without correction: 20/20 20/20 20/20   With correction:          Learning Assessment:     Learning Assessment 10/21/2015   PRIMARY LEARNER Patient   HIGHEST LEVEL OF EDUCATION - PRIMARY LEARNER  SOME COLLEGE   BARRIERS PRIMARY LEARNER NONE   CO-LEARNER CAREGIVER No   PRIMARY LANGUAGE ENGLISH   LEARNER PREFERENCE PRIMARY LISTENING   ANSWERED BY self   RELATIONSHIP SELF     Depression Screening:     PHQ over the last two weeks 2018   Little interest or pleasure in doing things More than half the days   Feeling down, depressed or hopeless Nearly every day   Total Score PHQ 2 5     Fall Risk Assessment:   No flowsheet data found. Abuse Screening:     Abuse Screening Questionnaire 2014   Do you ever feel afraid of your partner? N   Are you in a relationship with someone who physically or mentally threatens you? N   Is it safe for you to go home? Y     Coordination of Care Questionaire:   1. Have you been to the ER, urgent care clinic since your last visit? Hospitalized since your last visit ? No     2. Have you seen or consulted any other health care providers outside of the [facility] since your last visit? Include any pap smears or colon screening. Yes, Dr Sahil Martines endocrinology 2018, changed levothyroxine, has not picked it up yet     Advanced Directive:   1. Do you have an Advanced Directive  ? No     2. Would you like information on Advanced Directives?  Yes   Health Maintenance Due   Topic Date Due    FOBT Q 1 YEAR AGE 50-75  2018     Pt positive for depression, please do phq9  Pt may need order for 6 month diagnostic mammo follow up .  Pt states they placed a marker in one breast.

## 2018-07-02 NOTE — PROGRESS NOTES
SUBJECTIVE:   46 y.o. female for annual routine checkup. Has been having a tough time with depression x 2 months, not wanting to get out of bed. No suicidal thoughts. (see PHQ-9). HTN: controlled. HLD: stable. Time for her to repeat her RT diagnostic mammo. Current Outpatient Prescriptions   Medication Sig Dispense Refill    venlafaxine-SR (EFFEXOR-XR) 75 mg capsule Take 1 Cap by mouth daily. 90 Cap 0    pravastatin (PRAVACHOL) 10 mg tablet TAKE ONE TABLET BY MOUTH AT BEDTIME 90 Tab 0    lisinopril (PRINIVIL, ZESTRIL) 20 mg tablet TAKE ONE TABLET BY MOUTH ONCE DAILY 30 Tab 2    NORGESTIMATE-ETHINYL ESTRADIOL (TRI-LO-ESTARYLLA PO) Take  by mouth.  UBIDECARENONE/VITAMIN E MIXED (COQ10  PO) Take  by mouth daily.  ibuprofen (MOTRIN) 800 mg tablet 800 mg every six (6) hours as needed.  cholecalciferol (VITAMIN D3) 1,000 unit cap Take  by mouth daily.  levothyroxine (SYNTHROID) 112 mcg tablet TAKE ONE TABLET BY MOUTH ONCE DAILY BEFORE BREAKFAST FOR HYPOTHYROIDISM 90 Tab 0       Past Medical History:   Diagnosis Date    Endometriosis     Episode of recurrent major depressive disorder ([facility] [address] 7/2/2018    Essential hypertension 11/9/2016    Fibromyalgia 10/21/2015         Frozen shoulder     Gastroparesis 10/18/2012    GERD (gastroesophageal reflux disease) 10/18/2012    Hashimoto's thyroiditis 3/20/2012    Hiatal hernia 3/28/2016    Hyperlipidemia 3/20/2012    Hypothyroidism 3/20/2012    Hypothyroidism due to acquired atrophy of thyroid 9/8/2016    Hypovitaminosis D 1/12/2017    Nausea & vomiting        Allergies: Codeine; Morphine; Oxycodone; and Simvastatin   No LMP recorded. Patient has had a hysterectomy. ROS:  Feeling well. No dyspnea or chest pain on exertion. No abdominal pain, change in bowel habits, black or bloody stools. No urinary tract symptoms.  GYN ROS: normal menses, no abnormal bleeding, pelvic pain or discharge, no breast pain or new or enlarging lumps on self exam. No neurological complaints. OBJECTIVE:   The patient appears well, alert, oriented x 3, in no distress. Visit Vitals    /72 (BP 1 Location: Left arm, BP Patient Position: Sitting)  Comment (BP Patient Position): sitt    Pulse 88    Temp 98.8 °F (37.1 °C) (Oral)    Resp 16    Ht 5' 5.67\" (1.668 m)    Wt 234 lb (106.1 kg)    SpO2 97%    BMI 38.15 kg/m2       General appearance: alert, cooperative, no distress, appears stated age  Head: Normocephalic, without obvious abnormality, atraumatic  Ears: normal TM's and external ear canals AU  Throat: Lips, mucosa, and tongue normal. Teeth and gums normal  Neck: supple, symmetrical, trachea midline, no adenopathy, thyroid: not enlarged, symmetric, no tenderness/mass/nodules, no carotid bruit and no JVD  Back: symmetric, no curvature. ROM normal. No CVA tenderness. Lungs: clear to auscultation bilaterally  Heart: regular rate and rhythm, S1, S2 normal, no murmur, click, rub or gallop  Abdomen: soft, non-tender. Bowel sounds normal. No masses,  no organomegaly  Extremities: extremities normal, atraumatic, no cyanosis or edema  Pulses: 2+ and symmetric  Skin: Skin color, texture, turgor normal. No rashes or lesions  Neurological is normal, no focal findings.            Lab Results   Component Value Date/Time    WBC 9.7 06/27/2018 07:44 AM    Hemoglobin, POC 12.2 03/27/2017 09:20 AM    HGB 13.8 06/27/2018 07:44 AM    Hematocrit, POC 36 03/27/2017 09:20 AM    HCT 42.6 06/27/2018 07:44 AM    PLATELET 581 19/48/8068 07:44 AM    MCV 90 06/27/2018 07:44 AM         Lab Results   Component Value Date/Time    Sodium 143 06/27/2018 07:44 AM    Potassium 5.0 06/27/2018 07:44 AM    Chloride 102 06/27/2018 07:44 AM    CO2 21 06/27/2018 07:44 AM    Glucose 103 (H) 06/27/2018 07:44 AM    BUN 14 06/27/2018 07:44 AM    Creatinine 0.91 06/27/2018 07:44 AM    BUN/Creatinine ratio 15 06/27/2018 07:44 AM    GFR est AA 85 06/27/2018 07:44 AM    GFR est non-AA 74 06/27/2018 07:44 AM    Calcium 9.8 06/27/2018 07:44 AM         Lab Results   Component Value Date/Time    ALT (SGPT) 18 06/27/2018 07:44 AM    AST (SGOT) 15 06/27/2018 07:44 AM    Alk. phosphatase 81 06/27/2018 07:44 AM    Bilirubin, direct 0.05 06/27/2018 07:44 AM    Bilirubin, total <0.2 06/27/2018 07:44 AM         Lab Results   Component Value Date/Time    Cholesterol, total 174 06/27/2018 07:44 AM    HDL Cholesterol 48 06/27/2018 07:44 AM    LDL, calculated 107 (H) 06/27/2018 07:44 AM    VLDL, calculated 19 06/27/2018 07:44 AM    Triglyceride 95 06/27/2018 07:44 AM         Lab Results   Component Value Date/Time    TSH 5.260 (H) 06/27/2018 07:44 AM    T4, Free 1.37 06/27/2018 07:44 AM         Lab Results   Component Value Date/Time    VITAMIN D, 25-HYDROXY 35.8 06/27/2018 07:44 AM             ASSESSMENT:   Diagnoses and all orders for this visit:    1. Annual physical exam    2. Breast calcification, right  -     LISA MAMMO RT DX INCL CAD; Future    3. Severe episode of recurrent major depressive disorder, without psychotic features ([address]  -     venlafaxine-SR (EFFEXOR-XR) 75 mg capsule; Take 1 Cap by mouth daily. 4. Pure hypercholesterolemia    5. Severe obesity (BMI 35.0-39.9) ([address]    6. Essential hypertension          PLAN:   Mammogram: done. pap smear  return annually or prn     Advised pt on getting Shingrix. Will start her on Effexor and will f/u in 4 weeks. The plan was discussed with the patient. The patient verbalized understanding and is in agreement with the plan. All medication potential side effects were discussed with the patient.

## 2018-07-02 NOTE — PATIENT INSTRUCTIONS
Well Visit, Women 48 to 72: Care Instructions  Your Care Instructions    Physical exams can help you stay healthy. Your doctor has checked your overall health and may have suggested ways to take good care of yourself. He or she also may have recommended tests. At home, you can help prevent illness with healthy eating, regular exercise, and other steps. Follow-up care is a key part of your treatment and safety. Be sure to make and go to all appointments, and call your doctor if you are having problems. It's also a good idea to know your test results and keep a list of the medicines you take. How can you care for yourself at home? · Reach and stay at a healthy weight. This will lower your risk for many problems, such as obesity, diabetes, heart disease, and high blood pressure. · Get at least 30 minutes of exercise on most days of the week. Walking is a good choice. You also may want to do other activities, such as running, swimming, cycling, or playing tennis or team sports. · Do not smoke. Smoking can make health problems worse. If you need help quitting, talk to your doctor about stop-smoking programs and medicines. These can increase your chances of quitting for good. · Protect your skin from too much sun. When you're outdoors from 10 a.m. to 4 p.m., stay in the shade or cover up with clothing and a hat with a wide brim. Wear sunglasses that block UV rays. Even when it's cloudy, put broad-spectrum sunscreen (SPF 30 or higher) on any exposed skin. · See a dentist one or two times a year for checkups and to have your teeth cleaned. · Wear a seat belt in the car. · Limit alcohol to 1 drink a day. Too much alcohol can cause health problems. Follow your doctor's advice about when to have certain tests. These tests can spot problems early. · Cholesterol.  Your doctor will tell you how often to have this done based on your age, family history, or other things that can increase your risk for heart attack and stroke. · Blood pressure. Have your blood pressure checked during a routine doctor visit. Your doctor will tell you how often to check your blood pressure based on your age, your blood pressure results, and other factors. · Mammogram. Ask your doctor how often you should have a mammogram, which is an X-ray of your breasts. A mammogram can spot breast cancer before it can be felt and when it is easiest to treat. · Pap test and pelvic exam. Ask your doctor how often you should have a Pap test. You may not need to have a Pap test as often as you used to. · Vision. Have your eyes checked every year or two or as often as your doctor suggests. Some experts recommend that you have yearly exams for glaucoma and other age-related eye problems starting at age 48. · Hearing. Tell your doctor if you notice any change in your hearing. You can have tests to find out how well you hear. · Diabetes. Ask your doctor whether you should have tests for diabetes. · Colon cancer. You should begin tests for colon cancer at age 48. You may have one of several tests. Your doctor will tell you how often to have tests based on your age and risk. Risks include whether you already had a precancerous polyp removed from your colon or whether your parents, sisters and brothers, or children have had colon cancer. · Thyroid disease. Talk to your doctor about whether to have your thyroid checked as part of a regular physical exam. Women have an increased chance of a thyroid problem. · Osteoporosis. You should begin tests for bone density at age 72. If you are younger than 72, ask your doctor whether you have factors that may increase your risk for this disease. You may want to have this test before age 72. · Heart attack and stroke risk. At least every 4 to 6 years, you should have your risk for heart attack and stroke assessed.  Your doctor uses factors such as your age, blood pressure, cholesterol, and whether you smoke or have diabetes to show what your risk for a heart attack or stroke is over the next 10 years. When should you call for help? Watch closely for changes in your health, and be sure to contact your doctor if you have any problems or symptoms that concern you. Where can you learn more? Go to http://yoel-osvaldo.info/. Enter A616 in the search box to learn more about \"Well Visit, Women 50 to 72: Care Instructions. \"  Current as of: May 12, 2017  Content Version: 11.4  © 8820-3461 Healthwise, Incorporated. Care instructions adapted under license by [employer] (which disclaims liability or warranty for this information). If you have questions about a medical condition or this instruction, always ask your healthcare professional. [address] any warranty or liability for your use of this information.

## 2018-07-02 NOTE — MR AVS SNAPSHOT
[address] [place] [address]  [address] [facility] [address] [phone] Patient: Loretta Nunez 
MRN: [id number]:[id number] Visit Information Date & Time Provider Department Dept. Phone Encounter #  
 7/2/2018  1:30 PM Lana Latham, [address] [phone] [id number] Upcoming Health Maintenance Date Due FOBT Q 1 YEAR AGE 50-75 3/23/2018 Influenza Age 5 to Adult 8/1/2018 BREAST CANCER SCRN MAMMOGRAM 12/8/2019 DTaP/Tdap/Td series (2 - Td) 7/31/2025 Allergies as of 7/2/2018  Review Complete On: 7/2/2018 By: Karina Rivers LPN Severity Noted Reaction Type Reactions Codeine Medium 07/06/2017    Hives Hydrocodone Morphine  03/20/2017    Myalgia, Other (comments) Headache Oxycodone  06/18/2015    Rash Simvastatin  03/20/2017    Other (comments) Leg cramps Severe muscle cramps Current Immunizations  Reviewed on 7/2/2018 Name Date Influenza Vaccine 10/15/2015 Influenza Vaccine (Quad) PF 9/8/2016 Influenza Vaccine Split 10/18/2012 Tdap 7/31/2015 Reviewed by Lana Latham MD on 7/2/2018 at  2:00 PM  
You Were Diagnosed With   
  
 Codes Comments Annual physical exam    -  Primary ICD-10-CM: Z00.00 ICD-9-CM: V70.0 Breast calcification, right     ICD-10-CM: R92.1 ICD-9-CM: 793.89 Vitals BP Pulse Temp Resp Height(growth percentile) Weight(growth percentile) 114/72 (BP 1 Location: Left arm, BP Patient Position: Sitting) 88 98.8 °F (37.1 °C) (Oral) 16 5' 5.67\" (1.668 m) 234 lb (106.1 kg) SpO2 BMI OB Status Smoking Status 97% 38.15 kg/m2 Hysterectomy Never Smoker BMI and BSA Data Body Mass Index Body Surface Area  
 38.15 kg/m 2 2.22 m 2 Preferred Pharmacy Pharmacy Name Phone [facility] [address] [place] [address], [address] [facility] [phone] Your Updated Medication List  
  
 This list is accurate as of 7/2/18  2:01 PM.  Always use your most recent med list.  
  
  
  
  
 COQ10  PO Take  by mouth daily. ibuprofen 800 mg tablet Commonly known as:  MOTRIN  
800 mg every six (6) hours as needed. levothyroxine 112 mcg tablet Commonly known as:  SYNTHROID  
TAKE ONE TABLET BY MOUTH ONCE DAILY BEFORE BREAKFAST FOR HYPOTHYROIDISM  
  
 lisinopril 20 mg tablet Commonly known as:  PRINIVIL, ZESTRIL  
TAKE ONE TABLET BY MOUTH ONCE DAILY pravastatin 10 mg tablet Commonly known as:  PRAVACHOL  
TAKE ONE TABLET BY MOUTH AT BEDTIME  
  
 TRI-LO-ESTARYLLA PO Take  by mouth. venlafaxine-SR 75 mg capsule Commonly known as:  EFFEXOR-XR Take 1 Cap by mouth daily. VITAMIN D3 1,000 unit Cap Generic drug:  cholecalciferol Take  by mouth daily. Prescriptions Sent to Pharmacy Refills  
 venlafaxine-SR (EFFEXOR-XR) 75 mg capsule 0 Sig: Take 1 Cap by mouth daily. Class: Normal  
 Pharmacy: [address]  [address] [address] [address] [facility] #: [phone] Route: Oral  
  
To-Do List   
 07/02/2018 Imaging:  LISA MAMMO RT DX INCL CAD Patient Instructions Well Visit, Women 48 to 72: Care Instructions Your Care Instructions Physical exams can help you stay healthy. Your doctor has checked your overall health and may have suggested ways to take good care of yourself. He or she also may have recommended tests. At home, you can help prevent illness with healthy eating, regular exercise, and other steps. Follow-up care is a key part of your treatment and safety. Be sure to make and go to all appointments, and call your doctor if you are having problems. It's also a good idea to know your test results and keep a list of the medicines you take. How can you care for yourself at home? · Reach and stay at a healthy weight.  This will lower your risk for many problems, such as obesity, diabetes, heart disease, and high blood pressure. · Get at least 30 minutes of exercise on most days of the week. Walking is a good choice. You also may want to do other activities, such as running, swimming, cycling, or playing tennis or team sports. · Do not smoke. Smoking can make health problems worse. If you need help quitting, talk to your doctor about stop-smoking programs and medicines. These can increase your chances of quitting for good. · Protect your skin from too much sun. When you're outdoors from 10 a.m. to 4 p.m., stay in the shade or cover up with clothing and a hat with a wide brim. Wear sunglasses that block UV rays. Even when it's cloudy, put broad-spectrum sunscreen (SPF 30 or higher) on any exposed skin. · See a dentist one or two times a year for checkups and to have your teeth cleaned. · Wear a seat belt in the car. · Limit alcohol to 1 drink a day. Too much alcohol can cause health problems. Follow your doctor's advice about when to have certain tests. These tests can spot problems early. · Cholesterol. Your doctor will tell you how often to have this done based on your age, family history, or other things that can increase your risk for heart attack and stroke. · Blood pressure. Have your blood pressure checked during a routine doctor visit. Your doctor will tell you how often to check your blood pressure based on your age, your blood pressure results, and other factors. · Mammogram. Ask your doctor how often you should have a mammogram, which is an X-ray of your breasts. A mammogram can spot breast cancer before it can be felt and when it is easiest to treat. · Pap test and pelvic exam. Ask your doctor how often you should have a Pap test. You may not need to have a Pap test as often as you used to. · Vision. Have your eyes checked every year or two or as often as your doctor suggests.  Some experts recommend that you have yearly exams for glaucoma and other age-related eye problems starting at age 48. · Hearing. Tell your doctor if you notice any change in your hearing. You can have tests to find out how well you hear. · Diabetes. Ask your doctor whether you should have tests for diabetes. · Colon cancer. You should begin tests for colon cancer at age 48. You may have one of several tests. Your doctor will tell you how often to have tests based on your age and risk. Risks include whether you already had a precancerous polyp removed from your colon or whether your parents, sisters and brothers, or children have had colon cancer. · Thyroid disease. Talk to your doctor about whether to have your thyroid checked as part of a regular physical exam. Women have an increased chance of a thyroid problem. · Osteoporosis. You should begin tests for bone density at age 72. If you are younger than 72, ask your doctor whether you have factors that may increase your risk for this disease. You may want to have this test before age 72. · Heart attack and stroke risk. At least every 4 to 6 years, you should have your risk for heart attack and stroke assessed. Your doctor uses factors such as your age, blood pressure, cholesterol, and whether you smoke or have diabetes to show what your risk for a heart attack or stroke is over the next 10 years. When should you call for help? Watch closely for changes in your health, and be sure to contact your doctor if you have any problems or symptoms that concern you. Where can you learn more? Go to http://yoel-osvaldo.info/. Enter U233 in the search box to learn more about \"Well Visit, Women 50 to 72: Care Instructions. \" Current as of: May 12, 2017 Content Version: 11.4 © 7463-1367 [employer]. Care instructions adapted under license by [employer] (which disclaims liability or warranty for this information).  If you have questions about a medical condition or this instruction, always ask your healthcare professional. [place] Dominguez any warranty or liability for your use of this information. Introducing [facility]! Dear [employer]: Thank you for requesting a [employer] account. Our records indicate that you already have an active [employer] account. You can access your account anytime at [email or web address] Did you know that you can access your hospital and ER discharge instructions at any time in [employer]? You can also review all of your test results from your hospital stay or ER visit. Additional Information If you have questions, please visit the Frequently Asked Questions section of the [employer] website at [email or web address] Remember, [employer] is NOT to be used for urgent needs. For medical emergencies, dial 911. Now available from your iPhone and Android! Please provide this summary of care documentation to your next provider. Your primary care clinician is listed as Fredy Wallace. If you have any questions after today's visit, please call [phone].

## 2018-07-30 ENCOUNTER — OFFICE VISIT (OUTPATIENT)
Dept: FAMILY MEDICINE CLINIC | Age: 48
End: 2018-07-30

## 2018-07-30 VITALS
WEIGHT: 234.4 LBS | SYSTOLIC BLOOD PRESSURE: 118 MMHG | TEMPERATURE: 98.6 F | BODY MASS INDEX: 39.05 KG/M2 | DIASTOLIC BLOOD PRESSURE: 72 MMHG | RESPIRATION RATE: 15 BRPM | OXYGEN SATURATION: 97 % | HEIGHT: 65 IN | HEART RATE: 87 BPM

## 2018-07-30 DIAGNOSIS — F33.2 SEVERE EPISODE OF RECURRENT MAJOR DEPRESSIVE DISORDER, WITHOUT PSYCHOTIC FEATURES (HCC): Primary | ICD-10-CM

## 2018-07-30 RX ORDER — BUPROPION HYDROCHLORIDE 150 MG/1
150 TABLET ORAL
Qty: 30 TAB | Refills: 1 | Status: SHIPPED | OUTPATIENT
Start: 2018-07-30 | End: 2018-10-03 | Stop reason: SDUPTHER

## 2018-07-30 NOTE — PROGRESS NOTES
Assessment/Plan:    *Diagnoses and all orders for this visit:    1. Severe episode of recurrent major depressive disorder, without psychotic features ([address]        Will try her now on Wellbutrin xl 150 mg. She was instructed to send me a message on my chart if she has any issues with this med. F/u 4 weeks. The plan was discussed with the patient. The patient verbalized understanding and is in agreement with the plan. All medication potential side effects were discussed with the patient.    -------------------------------------------------------------------------------------------------------------------        Paulkingston Reese is a 46 y.o. female and presents with Depression (follow up)         Subjective:  Pt returns today for f/u after I started her on Effexor for depression that she has been experiencing. She tells me now that she had to stop taking it b/c it made her very nauseous. She is still having depression and not sleeping all that well. She never contacted me before this to tell me she had any issue. I advised her that in the future, she should contact us with issues so we can address things right away. ROS:  Constitutional: No recent weight change. No weakness/fatigue. No f/c. Skin: No rashes, change in nails/hair, itching   HENT: No HA, dizziness. No hearing loss/tinnitus. No nasal congestion/discharge. Eyes: No change in vision, double/blurred vision or eye pain/redness. Cardiovascular: No CP/palpitations. No GRANDE/orthopnea/PND. Respiratory: No cough/sputum, dyspnea, wheezing. Gastointestinal: No dysphagia, reflux. No n/v. No constipation/diarrhea. No melena/rectal bleeding. Genitourinary: No dysuria, urinary hesitancy, nocturia, hematuria. No incontinence. Musculoskeletal: No joint pain/stiffness. No muscle pain/tenderness. Endo: No heat/cold intolerance, no polyuria/polydypsia. Heme: No h/o anemia. No easy bleeding/bruising.    Allergy/Immunology: No seasonal rhinitis. Denies frequent colds, sinus/ear infections. Neurological: No seizures/numbness/weakness. No paresthesias. Psychiatric:  No depression, anxiety. The problem list was updated as a part of today's visit. Patient Active Problem List   Diagnosis Code    Hashimoto's thyroiditis E06.3    Hyperlipidemia E78.5    Gastroparesis K31.84    GERD (gastroesophageal reflux disease) K21.9    Fibromyalgia M79.7    Hiatal hernia K44.9    Hypothyroidism due to acquired atrophy of thyroid E03.4    Essential hypertension I10    Hypovitaminosis D E55.9    Severe obesity (BMI 35.0-39.9) ([facility]) E66.01    Episode of recurrent major depressive disorder ([facility]) F33.9       The PSH, FH were reviewed. SH:  Social History   Substance Use Topics    Smoking status: Never Smoker    Smokeless tobacco: Never Used    Alcohol use No       Medications/Allergies:  Current Outpatient Prescriptions on File Prior to Visit   Medication Sig Dispense Refill    lisinopril (PRINIVIL, ZESTRIL) 20 mg tablet TAKE 1 TABLET BY MOUTH ONCE DAILY 30 Tab 0    levothyroxine (SYNTHROID) 112 mcg tablet TAKE ONE TABLET BY MOUTH ONCE DAILY BEFORE BREAKFAST FOR HYPOTHYROIDISM 90 Tab 0    pravastatin (PRAVACHOL) 10 mg tablet TAKE ONE TABLET BY MOUTH AT BEDTIME 90 Tab 0    NORGESTIMATE-ETHINYL ESTRADIOL (TRI-LO-ESTARYLLA PO) Take  by mouth.  UBIDECARENONE/VITAMIN E MIXED (COQ10  PO) Take  by mouth daily.  ibuprofen (MOTRIN) 800 mg tablet 800 mg every six (6) hours as needed.  cholecalciferol (VITAMIN D3) 1,000 unit cap Take  by mouth daily.  venlafaxine-SR (EFFEXOR-XR) 75 mg capsule Take 1 Cap by mouth daily. 90 Cap 0     No current facility-administered medications on file prior to visit.          Allergies   Allergen Reactions    Codeine Hives     Hydrocodone      Morphine Myalgia and Other (comments)     Headache    Oxycodone Rash    Simvastatin Other (comments)     Leg cramps  Severe muscle cramps Health Maintenance:   Health Maintenance   Topic Date Due    FOBT Q 1 YEAR AGE 50-75  03/23/2018    Influenza Age 5 to Adult  08/01/2018    BREAST CANCER SCRN MAMMOGRAM  12/08/2019    DTaP/Tdap/Td series (2 - Td) 07/31/2025       Objective:  Visit Vitals    /72 (BP 1 Location: Left arm, BP Patient Position: Sitting)    Pulse 87    Temp 98.6 °F (37 °C) (Oral)    Resp 15    Ht 5' 5\" (1.651 m)    Wt 234 lb 6.4 oz (106.3 kg)    SpO2 97%    BMI 39.01 kg/m2          Nurses notes and VS reviewed. Physical Examination: General appearance - alert, well appearing, and in no distress and looks tired. Labwork and Ancillary Studies:    CBC w/Diff  Lab Results   Component Value Date/Time    WBC 9.7 06/27/2018 07:44 AM    HGB 13.8 06/27/2018 07:44 AM    PLATELET 890 14/29/6044 07:44 AM         Basic Metabolic Profile  Lab Results   Component Value Date/Time    Sodium 143 06/27/2018 07:44 AM    Potassium 5.0 06/27/2018 07:44 AM    Chloride 102 06/27/2018 07:44 AM    CO2 21 06/27/2018 07:44 AM    Glucose 103 (H) 06/27/2018 07:44 AM    BUN 14 06/27/2018 07:44 AM    Creatinine 0.91 06/27/2018 07:44 AM    BUN/Creatinine ratio 15 06/27/2018 07:44 AM    GFR est AA 85 06/27/2018 07:44 AM    GFR est non-AA 74 06/27/2018 07:44 AM    Calcium 9.8 06/27/2018 07:44 AM         LFT  Lab Results   Component Value Date/Time    ALT (SGPT) 18 06/27/2018 07:44 AM    AST (SGOT) 15 06/27/2018 07:44 AM    Alk.  phosphatase 81 06/27/2018 07:44 AM    Bilirubin, direct 0.05 06/27/2018 07:44 AM    Bilirubin, total <0.2 06/27/2018 07:44 AM         Cholesterol  Lab Results   Component Value Date/Time    Cholesterol, total 174 06/27/2018 07:44 AM    HDL Cholesterol 48 06/27/2018 07:44 AM    LDL, calculated 107 (H) 06/27/2018 07:44 AM    Triglyceride 95 06/27/2018 07:44 AM

## 2018-07-30 NOTE — PROGRESS NOTES
Gianfranco Lima is a 46 y.o. female (: 1970) presenting to address:    Chief Complaint   Patient presents with    Depression     follow up       Vitals:    18 0756   BP: 118/72   Pulse: 87   Resp: 15   Temp: 98.6 °F (37 °C)   TempSrc: Oral   SpO2: 97%   Weight: 234 lb 6.4 oz (106.3 kg)   Height: 5' 5\" (1.651 m)   PainSc:   0 - No pain       Hearing/Vision:   No exam data present    Learning Assessment:     Learning Assessment 10/21/2015   PRIMARY LEARNER Patient   HIGHEST LEVEL OF EDUCATION - PRIMARY LEARNER  SOME COLLEGE   BARRIERS PRIMARY LEARNER NONE   CO-LEARNER CAREGIVER No   PRIMARY LANGUAGE ENGLISH   LEARNER PREFERENCE PRIMARY LISTENING   ANSWERED BY self   RELATIONSHIP SELF     Depression Screening:     PHQ over the last two weeks 2018   Little interest or pleasure in doing things More than half the days   Feeling down, depressed, irritable, or hopeless Nearly every day   Total Score PHQ 2 5   Trouble falling or staying asleep, or sleeping too much More than half the days   Feeling tired or having little energy More than half the days   Poor appetite, weight loss, or overeating Not at all   Feeling bad about yourself - or that you are a failure or have let yourself or your family down Several days   Trouble concentrating on things such as school, work, reading, or watching TV Not at all   Moving or speaking so slowly that other people could have noticed; or the opposite being so fidgety that others notice Not at all   Thoughts of being better off dead, or hurting yourself in some way Not at all   How difficult have these problems made it for you to do your work, take care of your home and get along with others Very difficult     Fall Risk Assessment:     Fall Risk Assessment, last 12 mths 2018   Able to walk? Yes   Fall in past 12 months? No     Abuse Screening:     Abuse Screening Questionnaire 2014   Do you ever feel afraid of your partner?  N   Are you in a relationship with someone who physically or mentally threatens you? N   Is it safe for you to go home? Y     Coordination of Care Questionaire:   1. Have you been to the ER, urgent care clinic since your last visit? Hospitalized since your last visit? NO    2. Have you seen or consulted any other health care providers outside of the [address] [place] [address] since your last visit? Include any pap smears or colon screening. NO    Advanced Directive:   1. Do you have an Advanced Directive? NO    2. Would you like information on Advanced Directives?  NO

## 2018-07-30 NOTE — MR AVS SNAPSHOT
[address]  [address] 2201 [facility] [id number] [phone] Patient: Nhan Beyer 
MRN: [id number] FHR:[id number] Visit Information Date & Time Provider Department Dept. Phone Encounter #  
 7/30/2018  8:15 AM Nadia Ahuja, 3 [facility] [phone] [id number] Upcoming Health Maintenance Date Due FOBT Q 1 YEAR AGE 50-75 3/23/2018 Influenza Age 5 to Adult 8/1/2018 BREAST CANCER SCRN MAMMOGRAM 12/8/2019 DTaP/Tdap/Td series (2 - Td) 7/31/2025 Allergies as of 7/30/2018  Review Complete On: 7/30/2018 By: Nadia Ahuja MD  
  
 Severity Noted Reaction Type Reactions Codeine Medium 07/06/2017    Hives Hydrocodone Morphine  03/20/2017    Myalgia, Other (comments) Headache Oxycodone  06/18/2015    Rash Simvastatin  03/20/2017    Other (comments) Leg cramps Severe muscle cramps Current Immunizations  Reviewed on 7/2/2018 Name Date Influenza Vaccine 10/15/2015 Influenza Vaccine (Quad) PF 9/8/2016 Influenza Vaccine Split 10/18/2012 Tdap 7/31/2015 Not reviewed this visit You Were Diagnosed With   
  
 Codes Comments Severe episode of recurrent major depressive disorder, without psychotic features ([facility].)    -  Primary ICD-10-CM: F33.2 ICD-9-CM: 296.33 Vitals BP Pulse Temp Resp Height(growth percentile) Weight(growth percentile) 118/72 (BP 1 Location: Left arm, BP Patient Position: Sitting) 87 98.6 °F (37 °C) (Oral) 15 5' 5\" (1.651 m) 234 lb 6.4 oz (106.3 kg) SpO2 BMI OB Status Smoking Status 97% 39.01 kg/m2 Hysterectomy Never Smoker BMI and BSA Data Body Mass Index Body Surface Area 39.01 kg/m 2 2.21 m 2 Preferred Pharmacy Pharmacy Name Phone [address] 5310 [facility], 2201 [facility] [phone] Your Updated Medication List  
  
   
 This list is accurate as of 7/30/18  8:29 AM.  Always use your most recent med list.  
  
  
  
  
 buPROPion  mg tablet Commonly known as:  Elvie Galas Take 1 Tab by mouth every morning. COQ10  PO Take  by mouth daily. ibuprofen 800 mg tablet Commonly known as:  MOTRIN  
800 mg every six (6) hours as needed. levothyroxine 112 mcg tablet Commonly known as:  SYNTHROID  
TAKE ONE TABLET BY MOUTH ONCE DAILY BEFORE BREAKFAST FOR HYPOTHYROIDISM  
  
 lisinopril 20 mg tablet Commonly known as:  PRINIVIL, ZESTRIL  
TAKE 1 TABLET BY MOUTH ONCE DAILY pravastatin 10 mg tablet Commonly known as:  PRAVACHOL  
TAKE ONE TABLET BY MOUTH AT BEDTIME  
  
 TRI-LO-ESTARYLLA PO Take  by mouth. VITAMIN D3 1,000 unit Cap Generic drug:  cholecalciferol Take  by mouth daily. Prescriptions Sent to Pharmacy Refills buPROPion XL (WELLBUTRIN XL) 150 mg tablet 1 Sig: Take 1 Tab by mouth every morning. Class: Normal  
 Pharmacy: [address] [facility] [address] #: [phone] Route: Oral  
  
Patient Instructions Depression and Chronic Disease: Care Instructions Your Care Instructions A chronic disease is one that you have for a long time. Some chronic diseases can be controlled, but they usually cannot be cured. Depression is common in people with chronic diseases, but it often goes unnoticed. Many people have concerns about seeking treatment for a mental health problem. You may think it's a sign of weakness, or you don't want people to know about it. It's important to overcome these reasons for not seeking treatment. Treating depression or anxiety is good for your health. Follow-up care is a key part of your treatment and safety. Be sure to make and go to all appointments, and call your doctor if you are having problems.  It's also a good idea to know your test results and keep a list of the medicines you take. How can you care for yourself at home? Watch for symptoms of depression The symptoms of depression are often subtle at first. You may think they are caused by your disease rather than depression. Or you may think it is normal to be depressed when you have a chronic disease. If you are depressed you may: · Feel sad or hopeless. · Feel guilty or worthless. · Not enjoy the things you used to enjoy. · Feel hopeless, as though life is not worth living. · Have trouble thinking or remembering. · Have low energy, and you may not eat or sleep well. · Pull away from others. · Think often about death or killing yourself. (Keep the numbers for these national suicide hotlines: 0-429-167-TALK [1-720.676.9590] and 7-766-LGUZZFE [1-575.164.2758]. ) Get treatment By treating your depression, you can feel more hopeful and have more energy. If you feel better, you may take better care of yourself, so your health may improve. · Talk to your doctor if you have any changes in mood during treatment for your disease. · Ask your doctor for help. Counseling, antidepressant medicine, or a combination of the two can help most people with depression. Often a combination works best. Counseling can also help you cope with having a chronic disease. When should you call for help? Call 911 anytime you think you may need emergency care. For example, call if: 
  · You feel like hurting yourself or someone else.  
  · Someone you know has depression and is about to attempt or is attempting suicide.  
[facility] your doctor now or seek immediate medical care if: 
  · You hear voices.  
  · Someone you know has depression and: 
¨ Starts to give away his or her possessions. ¨ Uses illegal drugs or drinks alcohol heavily. ¨ Talks or writes about death, including writing suicide notes or talking about guns, knives, or pills. ¨ Starts to spend a lot of time alone. ¨ Acts very aggressively or suddenly appears calm.  Watch closely for changes in your health, and be sure to contact your doctor if: 
  · You do not get better as expected. Where can you learn more? Go to http://yoel-osvaldo.info/. Enter C899 in the search box to learn more about \"Depression and Chronic Disease: Care Instructions. \" Current as of: December 7, 2017 Content Version: 11.7 © 2960-8480 [employer]. Care instructions adapted under license by [employer] (which disclaims liability or warranty for this information). If you have questions about a medical condition or this instruction, always ask your healthcare professional. [place] [address] any warranty or liability for your use of this information. Introducing [facility]! [employer]: Thank you for requesting a [employer] account. Our records indicate that you already have an active [employer] account. You can access your account anytime at [email or web address] Did you know that you can access your hospital and ER discharge instructions at any time in [employer]? You can also review all of your test results from your hospital stay or ER visit. Additional Information If you have questions, please visit the Frequently Asked Questions section of the [employer] website at [email or web address] Remember, [employer] is NOT to be used for urgent needs. For medical emergencies, dial 911. Now available from your iPhone and Android! Please provide this summary of care documentation to your next provider. Your primary care clinician is listed as Fredy 51. If you have any questions after today's visit, please call [phone].

## 2018-07-30 NOTE — PATIENT INSTRUCTIONS
Depression and Chronic Disease: Care Instructions  Your Care Instructions    A chronic disease is one that you have for a long time. Some chronic diseases can be controlled, but they usually cannot be cured. Depression is common in people with chronic diseases, but it often goes unnoticed. Many people have concerns about seeking treatment for a mental health problem. You may think it's a sign of weakness, or you don't want people to know about it. It's important to overcome these reasons for not seeking treatment. Treating depression or anxiety is good for your health. Follow-up care is a key part of your treatment and safety. Be sure to make and go to all appointments, and call your doctor if you are having problems. It's also a good idea to know your test results and keep a list of the medicines you take. How can you care for yourself at home? Watch for symptoms of depression  The symptoms of depression are often subtle at first. You may think they are caused by your disease rather than depression. Or you may think it is normal to be depressed when you have a chronic disease. If you are depressed you may:  · Feel sad or hopeless. · Feel guilty or worthless. · Not enjoy the things you used to enjoy. · Feel hopeless, as though life is not worth living. · Have trouble thinking or remembering. · Have low energy, and you may not eat or sleep well. · Pull away from others. · Think often about death or killing yourself. (Keep the numbers for these national suicide hotlines: 4-144-347-TALK [1-726.954.4770] and 1-741-WXYPQLH [1-240.267.3457]. )  Get treatment  By treating your depression, you can feel more hopeful and have more energy. If you feel better, you may take better care of yourself, so your health may improve. · Talk to your doctor if you have any changes in mood during treatment for your disease. · Ask your doctor for help.  Counseling, antidepressant medicine, or a combination of the two can help most people with depression. Often a combination works best. Counseling can also help you cope with having a chronic disease. When should you call for help? Call 911 anytime you think you may need emergency care. For example, call if:    · You feel like hurting yourself or someone else.     · Someone you know has depression and is about to attempt or is attempting suicide.   [facility] your doctor now or seek immediate medical care if:    · You hear voices.     · Someone you know has depression and:  ¨ Starts to give away his or her possessions. ¨ Uses illegal drugs or drinks alcohol heavily. ¨ Talks or writes about death, including writing suicide notes or talking about guns, knives, or pills. ¨ Starts to spend a lot of time alone. ¨ Acts very aggressively or suddenly appears calm.    Watch closely for changes in your health, and be sure to contact your doctor if:    · You do not get better as expected. Where can you learn more? Go to http://yoel-osvaldo.info/. Enter U717 in the search box to learn more about \"Depression and Chronic Disease: Care Instructions. \"  Current as of: December 7, 2017  Content Version: 11.7  © 1396-4102 [employer]. Care instructions adapted under license by [employer][email or web address] (which disclaims liability or warranty for this information). If you have questions about a medical condition or this instruction, always ask your healthcare professional. [address] any warranty or liability for your use of this information.

## 2018-11-06 RX ORDER — BUPROPION HYDROCHLORIDE 300 MG/1
TABLET ORAL
Qty: 30 TAB | Refills: 0 | Status: SHIPPED | OUTPATIENT
Start: 2018-11-06 | End: 2018-12-14 | Stop reason: SDUPTHER

## 2018-11-20 DIAGNOSIS — I10 ESSENTIAL HYPERTENSION: ICD-10-CM

## 2018-11-21 RX ORDER — LISINOPRIL 20 MG/1
TABLET ORAL
Qty: 90 TAB | Refills: 0 | Status: SHIPPED | OUTPATIENT
Start: 2018-11-21 | End: 2018-12-14 | Stop reason: SDUPTHER

## 2018-12-14 ENCOUNTER — OFFICE VISIT (OUTPATIENT)
Dept: FAMILY MEDICINE CLINIC | Age: 48
End: 2018-12-14

## 2018-12-14 VITALS
HEIGHT: 65 IN | DIASTOLIC BLOOD PRESSURE: 72 MMHG | BODY MASS INDEX: 39.02 KG/M2 | HEART RATE: 66 BPM | WEIGHT: 234.2 LBS | TEMPERATURE: 98.3 F | SYSTOLIC BLOOD PRESSURE: 124 MMHG | OXYGEN SATURATION: 97 % | RESPIRATION RATE: 18 BRPM

## 2018-12-14 DIAGNOSIS — I10 ESSENTIAL HYPERTENSION: ICD-10-CM

## 2018-12-14 DIAGNOSIS — F33.2 SEVERE EPISODE OF RECURRENT MAJOR DEPRESSIVE DISORDER, WITHOUT PSYCHOTIC FEATURES (HCC): Primary | ICD-10-CM

## 2018-12-14 DIAGNOSIS — E03.4 HYPOTHYROIDISM DUE TO ACQUIRED ATROPHY OF THYROID: ICD-10-CM

## 2018-12-14 DIAGNOSIS — Z23 ENCOUNTER FOR IMMUNIZATION: ICD-10-CM

## 2018-12-14 DIAGNOSIS — E78.00 PURE HYPERCHOLESTEROLEMIA: ICD-10-CM

## 2018-12-14 RX ORDER — BUPROPION HYDROCHLORIDE 300 MG/1
TABLET ORAL
Qty: 90 TAB | Refills: 1 | Status: SHIPPED | OUTPATIENT
Start: 2018-12-14 | End: 2018-12-22

## 2018-12-14 RX ORDER — LISINOPRIL 20 MG/1
TABLET ORAL
Qty: 90 TAB | Refills: 1 | Status: SHIPPED | OUTPATIENT
Start: 2018-12-14 | End: 2019-02-07 | Stop reason: SDUPTHER

## 2018-12-14 RX ORDER — ESTRADIOL 1 MG/1
TABLET ORAL
COMMUNITY

## 2018-12-14 RX ORDER — PRAVASTATIN SODIUM 10 MG/1
TABLET ORAL
Qty: 90 TAB | Refills: 1 | Status: SHIPPED | OUTPATIENT
Start: 2018-12-14 | End: 2019-02-07 | Stop reason: SDUPTHER

## 2018-12-14 RX ORDER — LEVOTHYROXINE SODIUM 137 UG/1
TABLET ORAL
COMMUNITY
End: 2019-01-29 | Stop reason: SDUPTHER

## 2018-12-14 NOTE — PROGRESS NOTES
Jhonny Bernal is a 46 y.o. female (: 1970) presenting to address:    Chief Complaint   Patient presents with    Medication Evaluation       Vitals:    18 1453   BP: 124/72   Pulse: 66   Resp: 18   Temp: 98.3 °F (36.8 °C)   TempSrc: Oral   SpO2: 97%   Weight: 234 lb 3.2 oz (106.2 kg)   Height: 5' 5\" (1.651 m)   PainSc:   0 - No pain       Hearing/Vision:   No exam data present    Learning Assessment:     Learning Assessment 10/21/2015   PRIMARY LEARNER Patient   HIGHEST LEVEL OF EDUCATION - PRIMARY LEARNER  SOME COLLEGE   BARRIERS PRIMARY LEARNER NONE   CO-LEARNER CAREGIVER No   PRIMARY LANGUAGE ENGLISH   LEARNER PREFERENCE PRIMARY LISTENING   ANSWERED BY self   RELATIONSHIP SELF     Depression Screening:     PHQ over the last two weeks 2018   Little interest or pleasure in doing things More than half the days   Feeling down, depressed, irritable, or hopeless Nearly every day   Total Score PHQ 2 5   Trouble falling or staying asleep, or sleeping too much More than half the days   Feeling tired or having little energy More than half the days   Poor appetite, weight loss, or overeating Not at all   Feeling bad about yourself - or that you are a failure or have let yourself or your family down Several days   Trouble concentrating on things such as school, work, reading, or watching TV Not at all   Moving or speaking so slowly that other people could have noticed; or the opposite being so fidgety that others notice Not at all   Thoughts of being better off dead, or hurting yourself in some way Not at all   How difficult have these problems made it for you to do your work, take care of your home and get along with others Very difficult     Fall Risk Assessment:     Fall Risk Assessment, last 12 mths 2018   Able to walk? Yes   Fall in past 12 months? No     Abuse Screening:     Abuse Screening Questionnaire 2014   Do you ever feel afraid of your partner?  N   Are you in a relationship with someone who physically or mentally threatens you? N   Is it safe for you to go home? Y     Coordination of Care Questionaire:   1. Have you been to the ER, urgent care clinic since your last visit? Hospitalized since your last visit? NO    2. Have you seen or consulted any other health care providers outside of the [address] [place] [address] since your last visit? Include any pap smears or colon screening. YES Dr. Ed Noyola Gynecologist    Advanced Directive:   1. Do you have an Advanced Directive? NO    2. Would you like information on Advanced Directives?  NO

## 2018-12-14 NOTE — PATIENT INSTRUCTIONS
Depression and Chronic Disease: Care Instructions  Your Care Instructions    A chronic disease is one that you have for a long time. Some chronic diseases can be controlled, but they usually cannot be cured. Depression is common in people with chronic diseases, but it often goes unnoticed. Many people have concerns about seeking treatment for a mental health problem. You may think it's a sign of weakness, or you don't want people to know about it. It's important to overcome these reasons for not seeking treatment. Treating depression or anxiety is good for your health. Follow-up care is a key part of your treatment and safety. Be sure to make and go to all appointments, and call your doctor if you are having problems. It's also a good idea to know your test results and keep a list of the medicines you take. How can you care for yourself at home? Watch for symptoms of depression  The symptoms of depression are often subtle at first. You may think they are caused by your disease rather than depression. Or you may think it is normal to be depressed when you have a chronic disease. If you are depressed you may:  · Feel sad or hopeless. · Feel guilty or worthless. · Not enjoy the things you used to enjoy. · Feel hopeless, as though life is not worth living. · Have trouble thinking or remembering. · Have low energy, and you may not eat or sleep well. · Pull away from others. · Think often about death or killing yourself. (Keep the numbers for these national suicide hotlines: 6-303-870-TALK [1-768.968.7820] and 8-558-SODLRDU [1-699.552.6337]. )  Get treatment  By treating your depression, you can feel more hopeful and have more energy. If you feel better, you may take better care of yourself, so your health may improve. · Talk to your doctor if you have any changes in mood during treatment for your disease. · Ask your doctor for help.  Counseling, antidepressant medicine, or a combination of the two can help most people with depression. Often a combination works best. Counseling can also help you cope with having a chronic disease. When should you call for help? Call 911 anytime you think you may need emergency care. For example, call if:    · You feel like hurting yourself or someone else.     · Someone you know has depression and is about to attempt or is attempting suicide.   [facility] your doctor now or seek immediate medical care if:    · You hear voices.     · Someone you know has depression and:  ? Starts to give away his or her possessions. ? Uses illegal drugs or drinks alcohol heavily. ? Talks or writes about death, including writing suicide notes or talking about guns, knives, or pills. ? Starts to spend a lot of time alone. ? Acts very aggressively or suddenly appears calm.    Watch closely for changes in your health, and be sure to contact your doctor if:    · You do not get better as expected. Where can you learn more? Go to http://yoel-osvaldo.info/. Enter K063 in the search box to learn more about \"Depression and Chronic Disease: Care Instructions. \"  Current as of: December 7, 2017  Content Version: 11.8  © 6911-3919 Healthwise, Incorporated. Care instructions adapted under license by [employer] (which disclaims liability or warranty for this information). If you have questions about a medical condition or this instruction, always ask your healthcare professional. [address] any warranty or liability for your use of this information.

## 2018-12-14 NOTE — PROGRESS NOTES
Flu shot ,shringrix # 1 Immunization/s administered 12/14/2018 by Jade Farnsworth LPN with guardian's consent. Patient tolerated procedure well. No reactions noted.

## 2018-12-17 NOTE — PROGRESS NOTES
Assessment/Plan:    *Diagnoses and all orders for this visit:    1. Severe episode of recurrent major depressive disorder, without psychotic features ([facility]  -     buPROPion XL (WELLBUTRIN XL) 300 mg XL tablet; TAKE 1 TABLET BY MOUTH ONCE DAILY IN THE MORNING    2. Hypothyroidism due to acquired atrophy of thyroid  -     TSH 3RD GENERATION; Future  -     T4, FREE; Future    3. Essential hypertension  -     lisinopril (PRINIVIL, ZESTRIL) 20 mg tablet; TAKE 1 TABLET BY MOUTH ONCE DAILY    4. Pure hypercholesterolemia  -     pravastatin (PRAVACHOL) 10 mg tablet; TAKE 1 TABLET BY MOUTH ONCE DAILY AT BEDTIME    5. Encounter for immunization  -     INFLUENZA VIRUS VAC QUAD,SPLIT,PRESV FREE SYRINGE IM  -     ZOSTER VACC RECOMBINANT ADJUVANTED  -     [place] IMMUNIZ ADMIN,1 SINGLE/COMB VAC/TOXOID        Will return next when it is time for her physical.    The plan was discussed with the patient. The patient verbalized understanding and is in agreement with the plan. All medication potential side effects were discussed with the patient.    -------------------------------------------------------------------------------------------------------------------        Leatha Reese is a 46 y.o. female and presents with Medication Evaluation         Subjective:  Pt returns today, much later than we had previously advised. We placed her on wellbutrin for depression. She has done well with this, and the increase in dose has helped. Hypothyroidism: asymptomatic, doing well. HTN: well controlled. HLD: needs refill on meds, working well. ROS:  Review of Systems - Negative         The problem list was updated as a part of today's visit.   Patient Active Problem List   Diagnosis Code    Hashimoto's thyroiditis E06.3    Hyperlipidemia E78.5    Gastroparesis K31.84    GERD (gastroesophageal reflux disease) K21.9    Fibromyalgia M79.7    Hiatal hernia K44.9    Hypothyroidism due to acquired atrophy of thyroid E03.4    Essential hypertension I10    Hypovitaminosis D E55.9    Severe obesity (BMI 35.0-39. 9) E66.01    Episode of recurrent major depressive disorder (HCC) F33.9       The PSH, FH were reviewed. SH:  Social History     Tobacco Use    Smoking status: Never Smoker    Smokeless tobacco: Never Used   Substance Use Topics    Alcohol use: No    Drug use: No       Medications/Allergies:  Current Outpatient Medications on File Prior to Visit   Medication Sig Dispense Refill    estradiol (ESTRACE) 1 mg tablet estradiol 1 mg tablet   Take 1 tablet every day by oral route.  levothyroxine (SYNTHROID) 137 mcg tablet Take  by mouth Daily (before breakfast).  UBIDECARENONE/VITAMIN E MIXED (COQ10  PO) Take  by mouth daily.  ibuprofen (MOTRIN) 800 mg tablet 800 mg every six (6) hours as needed.  cholecalciferol (VITAMIN D3) 1,000 unit cap Take  by mouth daily.  NORGESTIMATE-ETHINYL ESTRADIOL (TRI-LO-ESTARYLLA PO) Take  by mouth. No current facility-administered medications on file prior to visit. Allergies   Allergen Reactions    Codeine Hives     Hydrocodone      Morphine Myalgia and Other (comments)     Headache    Oxycodone Rash    Simvastatin Other (comments)     Leg cramps  Severe muscle cramps         Health Maintenance:   Health Maintenance   Topic Date Due    FOBT Q 1 YEAR AGE 50-75  03/23/2018    Shingrix Vaccine Age 50> (2 of 2) 02/08/2019    BREAST CANCER SCRN MAMMOGRAM  08/07/2020    DTaP/Tdap/Td series (2 - Td) 07/31/2025    Influenza Age 9 to Adult  Completed       Objective:  Visit Vitals  /72 (BP 1 Location: Left arm, BP Patient Position: Sitting)   Pulse 66   Temp 98.3 °F (36.8 °C) (Oral)   Resp 18   Ht 5' 5\" (1.651 m)   Wt 234 lb 3.2 oz (106.2 kg)   SpO2 97%   BMI 38.97 kg/m²          Nurses notes and VS reviewed.       Physical Examination: General appearance - alert, well appearing, and in no distress  Chest - clear to auscultation, no wheezes, rales or rhonchi, symmetric air entry  Heart - normal rate, regular rhythm, normal S1, S2, no murmurs, rubs, clicks or gallops        Labwork and Ancillary Studies:    CBC w/Diff  Lab Results   Component Value Date/Time    WBC 9.7 06/27/2018 07:44 AM    HGB 13.8 06/27/2018 07:44 AM    PLATELET 912 33/18/0801 07:44 AM         Basic Metabolic Profile  Lab Results   Component Value Date/Time    Sodium 143 06/27/2018 07:44 AM    Potassium 5.0 06/27/2018 07:44 AM    Chloride 102 06/27/2018 07:44 AM    CO2 21 06/27/2018 07:44 AM    Glucose 103 (H) 06/27/2018 07:44 AM    BUN 14 06/27/2018 07:44 AM    Creatinine 0.91 06/27/2018 07:44 AM    BUN/Creatinine ratio 15 06/27/2018 07:44 AM    GFR est AA 85 06/27/2018 07:44 AM    GFR est non-AA 74 06/27/2018 07:44 AM    Calcium 9.8 06/27/2018 07:44 AM         LFT  Lab Results   Component Value Date/Time    ALT (SGPT) 18 06/27/2018 07:44 AM    AST (SGOT) 15 06/27/2018 07:44 AM    Alk.  phosphatase 81 06/27/2018 07:44 AM    Bilirubin, direct 0.05 06/27/2018 07:44 AM    Bilirubin, total <0.2 06/27/2018 07:44 AM         Cholesterol  Lab Results   Component Value Date/Time    Cholesterol, total 174 06/27/2018 07:44 AM    HDL Cholesterol 48 06/27/2018 07:44 AM    LDL, calculated 107 (H) 06/27/2018 07:44 AM    Triglyceride 95 06/27/2018 07:44 AM

## 2018-12-22 ENCOUNTER — HOSPITAL ENCOUNTER (EMERGENCY)
Age: 48
Discharge: HOME | End: 2018-12-22
Attending: EMERGENCY MEDICINE
Payer: COMMERCIAL

## 2018-12-22 VITALS
DIASTOLIC BLOOD PRESSURE: 81 MMHG | HEART RATE: 87 BPM | HEIGHT: 66 IN | SYSTOLIC BLOOD PRESSURE: 125 MMHG | TEMPERATURE: 98.7 F | OXYGEN SATURATION: 96 % | BODY MASS INDEX: 37.61 KG/M2 | RESPIRATION RATE: 20 BRPM | WEIGHT: 234 LBS

## 2018-12-22 DIAGNOSIS — J01.00 ACUTE NON-RECURRENT MAXILLARY SINUSITIS: Primary | ICD-10-CM

## 2018-12-22 PROCEDURE — 99281 EMR DPT VST MAYX REQ PHY/QHP: CPT

## 2018-12-22 RX ORDER — BUPROPION HYDROCHLORIDE 300 MG/1
300 TABLET ORAL
COMMUNITY
End: 2019-02-07 | Stop reason: SDUPTHER

## 2018-12-22 RX ORDER — BENZONATATE 100 MG/1
100 CAPSULE ORAL
Qty: 21 CAP | Refills: 0 | Status: SHIPPED | OUTPATIENT
Start: 2018-12-22 | End: 2018-12-29

## 2018-12-22 RX ORDER — AMOXICILLIN AND CLAVULANATE POTASSIUM 875; 125 MG/1; MG/1
1 TABLET, FILM COATED ORAL 2 TIMES DAILY
Qty: 14 TAB | Refills: 0 | Status: SHIPPED | OUTPATIENT
Start: 2018-12-22 | End: 2018-12-29

## 2018-12-22 NOTE — DISCHARGE INSTRUCTIONS
1.  Return if any concerns or worsening of condition(s)  2. Augmentin as prescribed until finished. 3.  Over the counter flonase and claritin for the next week and then stop. If sinus congestion recurs, then restart the flonase and claritin for a week at a time. 4.  Use a Nedi Pot to help clear your sinuses. You may purchase the original Nedi Pot at a pharmacy or get a generic version at [facility] [employer] or discount stores. 5.  Take the tessalon as prescribed as needed for cough. 6.  Take over the counter ibuprofen for pain and fever as needed as directed. 7.  FOLLOW UP APPOINTMENT:  Your primary doctor in 1 week. Saline Nasal Washes: Care Instructions  Your Care Instructions  Saline nasal washes help keep the nasal passages open by washing out thick or dried mucus. This simple remedy can help relieve symptoms of allergies, sinusitis, and colds. It also can make the nose feel more comfortable by keeping the mucous membranes moist. You may notice a little burning sensation in your nose the first few times you use the solution, but this usually gets better in a few days. Follow-up care is a key part of your treatment and safety. Be sure to make and go to all appointments, and call your doctor if you are having problems. It's also a good idea to know your test results and keep a list of the medicines you take. How can you care for yourself at home? · You can buy premixed saline solution in a squeeze bottle or other sinus rinse products at a drugstore. Read and follow the instructions on the label. · You also can make your own saline solution by adding 1 teaspoon of salt and 1 teaspoon of baking soda to 2 cups of distilled water. · If you use a homemade solution, pour a small amount into a clean bowl. Using a rubber bulb syringe, squeeze the syringe and place the tip in the salt water. Pull a small amount of the salt water into the syringe by relaxing your hand.   · Sit down with your head tilted slightly back. Do not lie down. Put the tip of the bulb syringe or the squeeze bottle a little way into one of your nostrils. Gently drip or squirt a few drops into the nostril. Repeat with the other nostril. Some sneezing and gagging are normal at first.  · Gently blow your nose. · Wipe the syringe or bottle tip clean after each use. · Repeat this 2 or 3 times a day. · Use nasal washes gently if you have nosebleeds often. When should you call for help? Watch closely for changes in your health, and be sure to contact your doctor if:    · You often get nosebleeds.     · You have problems doing the nasal washes. Where can you learn more? Go to http://yoel-osvaldo.info/. Enter [id number] in the search box to learn more about \"Saline Nasal Washes: Care Instructions. \"  Current as of: March 28, 2018  Content Version: 11.8  © 5039-2563 [employer]. Care instructions adapted under license by [employer] (which disclaims liability or warranty for this information). If you have questions about a medical condition or this instruction, always ask your healthcare professional. [place] [address] any warranty or liability for your use of this information. Sinusitis: Care Instructions  Your Care Instructions    Sinusitis is an infection of the lining of the sinus cavities in your head. Sinusitis often follows a cold. It causes pain and pressure in your head and face. In most cases, sinusitis gets better on its own in 1 to 2 weeks. But some mild symptoms may last for several weeks. Sometimes antibiotics are needed. Follow-up care is a key part of your treatment and safety. Be sure to make and go to all appointments, and call your doctor if you are having problems. It's also a good idea to know your test results and keep a list of the medicines you take. How can you care for yourself at home?   · Take an over-the-counter pain medicine, such as acetaminophen (Tylenol), ibuprofen (Advil, Motrin), or naproxen (Aleve). Read and follow all instructions on the label. · If the doctor prescribed antibiotics, take them as directed. Do not stop taking them just because you feel better. You need to take the full course of antibiotics. · Be careful when taking over-the-counter cold or flu medicines and Tylenol at the same time. Many of these medicines have acetaminophen, which is Tylenol. Read the labels to make sure that you are not taking more than the recommended dose. Too much acetaminophen (Tylenol) can be harmful. · Breathe warm, moist air from a steamy shower, a hot bath, or a sink filled with hot water. Avoid cold, dry air. Using a humidifier in your home may help. Follow the directions for cleaning the machine. · Use saline (saltwater) nasal washes to help keep your nasal passages open and wash out mucus and bacteria. You can buy saline nose drops at a grocery store or drugstore. Or you can make your own at home by adding 1 teaspoon of salt and 1 teaspoon of baking soda to 2 cups of distilled water. If you make your own, fill a bulb syringe with the solution, insert the tip into your nostril, and squeeze gently. Breann Santamaria your nose. · Put a hot, wet towel or a warm gel pack on your face 3 or 4 times a day for 5 to 10 minutes each time. · Try a decongestant nasal spray like oxymetazoline (Afrin). Do not use it for more than 3 days in a row. Using it for more than 3 days can make your congestion worse. When should you call for help? Call your doctor now or seek immediate medical care if:    · You have new or worse swelling or redness in your face or around your eyes.     · You have a new or higher fever.    Watch closely for changes in your health, and be sure to contact your doctor if:    · You have new or worse facial pain.     · The mucus from your nose becomes thicker (like pus) or has new blood in it.     · You are not getting better as expected.    Where can you learn more? Go to http://yoel-osvaldo.info/. Enter O618 in the search box to learn more about \"Sinusitis: Care Instructions. \"  Current as of: March 28, 2018  Content Version: 11.8  © 2024-6956 [employer]. Care instructions adapted under license by [employer] (which disclaims liability or warranty for this information). If you have questions about a medical condition or this instruction, always ask your healthcare professional. [place] [address] any warranty or liability for your use of this information.

## 2018-12-22 NOTE — ED PROVIDER NOTES
The history is provided by the patient. Nasal Congestion   This is a new problem. Episode onset: 3 weeks ago. The problem occurs constantly. The problem has been gradually worsening. Associated symptoms include headaches. Pertinent negatives include no chest pain, no abdominal pain and no shortness of breath. Nothing aggravates the symptoms. Nothing relieves the symptoms. She has tried acetaminophen (OTC decongestants, motrin) for the symptoms. Cough   This is a new problem. Episode onset: 3 weeks ago. The problem occurs every few minutes. The problem has been gradually worsening. The cough is non-productive. Associated symptoms include ear congestion, ear pain, headaches, rhinorrhea and myalgias. Pertinent negatives include no chest pain, no chills, no sweats, no weight loss, no eye redness, no sore throat, no shortness of breath, no wheezing, no nausea, no vomiting and no confusion. She has tried cough syrup and decongestants for the symptoms. The treatment provided no relief. She is not a smoker. Her past medical history does not include pneumonia, COPD, asthma or CHF. Past Medical History:   Diagnosis Date    Endometriosis     Episode of recurrent major depressive disorder ([facility] [address] 7/2/2018    Essential hypertension 11/9/2016    Fibromyalgia 10/21/2015         Frozen shoulder     Gastroparesis 10/18/2012    GERD (gastroesophageal reflux disease) 10/18/2012    Hashimoto's thyroiditis 3/20/2012    Hiatal hernia 3/28/2016    Hyperlipidemia 3/20/2012    Hypothyroidism 3/20/2012    Hypothyroidism due to acquired atrophy of thyroid 9/8/2016    Hypovitaminosis D 1/12/2017    Nausea & vomiting        Past Surgical History:   Procedure Laterality Date    HX CHOLECYSTECTOMY      03/27/17    HX GYN  7/2004    partial hysterectomy for endometriosis, has L ovary.     HX KNEE ARTHROSCOPY  2/2015    Dr. Philomena Taylor    HX LAP CHOLECYSTECTOMY  03/27/2017    HX OTHER SURGICAL Right 09/11/2017 Shoulder surgery rotator cuff and bicep repair    HX OVARIAN CYST REMOVAL  6/2015.  VAG HYST,RMV TUBE/OVARY  6-2015    Dr. Noble Prom         Family History:   Problem Relation Age of Onset    Diabetes Mother     Thyroid Disease Mother     Arthritis-osteo Mother     Elevated Lipids Mother     Diabetes Maternal Aunt     Diabetes Maternal Grandfather     Bipolar Disorder Sister        Social History     Socioeconomic History    Marital status:      Spouse name: Not on file    Number of children: Not on file    Years of education: Not on file    Highest education level: Not on file   Social Needs    Financial resource strain: Not on file    Food insecurity - worry: Not on file    Food insecurity - inability: Not on file   [employer] needs - medical: Not on file   [employer] needs - non-medical: Not on file   Occupational History    Not on file   Tobacco Use    Smoking status: Never Smoker    Smokeless tobacco: Never Used   Substance and Sexual Activity    Alcohol use: No    Drug use: No    Sexual activity: Not Currently   Other Topics Concern    Not on file   Social History Narrative    Not on file         ALLERGIES: Codeine; Morphine; Oxycodone; and Simvastatin    Review of Systems   Constitutional: Negative for chills, fever and weight loss. HENT: Positive for congestion, ear pain, nosebleeds, postnasal drip, rhinorrhea, sinus pressure, sinus pain and sneezing. Negative for ear discharge, sore throat, trouble swallowing and voice change. Eyes: Negative for pain and redness. Respiratory: Positive for cough. Negative for shortness of breath and wheezing. Cardiovascular: Negative for chest pain. Gastrointestinal: Negative for abdominal pain, constipation, diarrhea, nausea and vomiting. Genitourinary: Negative for dysuria. Musculoskeletal: Positive for myalgias. Negative for neck pain and neck stiffness. Skin: Negative. Neurological: Positive for headaches. Negative for dizziness, weakness, light-headedness and numbness. Psychiatric/Behavioral: Negative. Negative for confusion. All other systems reviewed and are negative. Vitals:    12/22/18 1326   BP: 125/81   Pulse: 87   Resp: 20   Temp: 98.7 °F (37.1 °C)   SpO2: 96%   Weight: 106.1 kg (234 lb)   Height: 5' 6\" (1.676 m)            Physical Exam   Constitutional: She is oriented to person, place, and time. She appears well-developed and well-nourished. No distress. HENT:   Head: Normocephalic and atraumatic. Right Ear: Tympanic membrane, external ear and ear canal normal.   Left Ear: Tympanic membrane, external ear and ear canal normal.   Nose: Rhinorrhea present. No nasal septal hematoma. No epistaxis. Right sinus exhibits no maxillary sinus tenderness and no frontal sinus tenderness. Left sinus exhibits maxillary sinus tenderness. Left sinus exhibits no frontal sinus tenderness. Mouth/Throat: Oropharynx is clear and moist and mucous membranes are normal. No oropharyngeal exudate. Eyes: Conjunctivae and EOM are normal. Pupils are equal, round, and reactive to light. Neck: Normal range of motion. Neck supple. Cardiovascular: Normal rate, regular rhythm and normal heart sounds. Exam reveals no gallop and no friction rub. No murmur heard. Pulmonary/Chest: Effort normal and breath sounds normal. No stridor. No respiratory distress. She has no wheezes. She has no rales. Abdominal: Soft. Bowel sounds are normal. There is no tenderness. Musculoskeletal: Normal range of motion. Lymphadenopathy:     She has no cervical adenopathy. Neurological: She is alert and oriented to person, place, and time. Skin: Skin is warm and dry. She is not diaphoretic. Psychiatric: She has a normal mood and affect. Her behavior is normal. Judgment and thought content normal.   Nursing note and vitals reviewed.        MDM  Number of Diagnoses or Management Options  Diagnosis management comments: DDx:  viral vs bacterial URI, sinusitis, influenza, asthma exacerbation, COPD, bronchitis, PNE, allergies    DIAGNOSIS:  1. Acute sinusitis    Plan/discharge instructions:  1. Return if any concerns or worsening of condition(s)  2. Augmentin as prescribed until finished. 3.  Over the counter flonase and claritin for the next week and then stop. If sinus congestion recurs, then restart the flonase and claritin for a week at a time. 4.  Use a Nedi Pot to help clear your sinuses. You may purchase the original Nedi Pot at a pharmacy or get a generic version at [employer] or [employer]. 5.  Take the tessalon as prescribed as needed for cough. 6.  Take over the counter ibuprofen for pain and fever as needed as directed. 7.  FOLLOW UP APPOINTMENT:  Your primary doctor in 1 week. Pt results have been reviewed with them. They have been counseled regarding diagnosis, treatment, and plan. Pt verbally conveys understanding and agreement of the signs, symptoms, diagnosis, treatment and prognosis and additionally agrees to follow up as discussed. Pt also agrees with the care-plan and conveys that all of their questions have been answered. I have also provided discharge instructions for them that include: educational information regarding their diagnosis and treatment, and list of reasons why they would want to return to the ED prior to their follow-up appointment, should their condition change. Amount and/or Complexity of Data Reviewed  Review and summarize past medical records: yes  Discuss the patient with other providers: yes    Risk of Complications, Morbidity, and/or Mortality  Presenting problems: low  Diagnostic procedures: low  Management options: low    Patient Progress  Patient progress: stable         Procedures      Diagnosis:   1. Acute non-recurrent maxillary sinusitis          Disposition: Discharge to home.      Follow-up Information     Follow up With Specialties Details Why Contact Info [id number] [facility] EMERGENCY DEPT Emergency Medicine  As needed, If symptoms worsen [address] Paula Lee MD Internal Medicine Go in 3 days  Amanda Jimenez  [phone]               Medication List      START taking these medications    amoxicillin-clavulanate 875-125 mg per tablet  Commonly known as:  AUGMENTIN  Take 1 Tab by mouth two (2) times a day for 7 days. benzonatate 100 mg capsule  Commonly known as:  TESSALON PERLES  Take 1 Cap by mouth three (3) times daily as needed for Cough for up to 7 days.         CONTINUE taking these medications    COQ10  PO     estradiol 1 mg tablet  Commonly known as:  ESTRACE     ibuprofen 800 mg tablet  Commonly known as:  MOTRIN     lisinopril 20 mg tablet  Commonly known as:  PRINIVIL, ZESTRIL  TAKE 1 TABLET BY MOUTH ONCE DAILY     pravastatin 10 mg tablet  Commonly known as:  PRAVACHOL  TAKE 1 TABLET BY MOUTH ONCE DAILY AT BEDTIME     SYNTHROID 137 mcg tablet  Generic drug:  levothyroxine     VITAMIN D3 1,000 unit Cap  Generic drug:  cholecalciferol     WELLBUTRIN  mg XL tablet  Generic drug:  buPROPion XL           Where to Get Your Medications      Information about where to get these medications is not yet available    Ask your nurse or doctor about these medications  · amoxicillin-clavulanate 875-125 mg per tablet  · benzonatate 100 mg capsule

## 2019-01-29 RX ORDER — LEVOTHYROXINE SODIUM 137 UG/1
137 TABLET ORAL
Qty: 30 TAB | Refills: 1 | Status: SHIPPED | OUTPATIENT
Start: 2019-01-29 | End: 2019-02-07 | Stop reason: SDUPTHER

## 2019-02-02 LAB
T4 FREE SERPL-MCNC: 1.36 NG/DL (ref 0.82–1.77)
TSH SERPL DL<=0.005 MIU/L-ACNC: 1.14 UIU/ML (ref 0.45–4.5)

## 2019-02-07 DIAGNOSIS — E78.00 PURE HYPERCHOLESTEROLEMIA: ICD-10-CM

## 2019-02-07 DIAGNOSIS — I10 ESSENTIAL HYPERTENSION: ICD-10-CM

## 2019-02-07 NOTE — TELEPHONE ENCOUNTER
Patient pharmacy is requesting a med refill of the following:    Pravastatin 10 mg last filled 12/14/18  @[facility]  Bupropion  mg (other provider)  Lisinopril 20 mg last filled 12/14/18 @[facility]  L-Thyroxine 137 mcg last filled 1/2/9/19 @[facility]    Last visit: 12/14/2018  Next appointment: none

## 2019-02-08 RX ORDER — LISINOPRIL 20 MG/1
TABLET ORAL
Qty: 90 TAB | Refills: 1 | Status: SHIPPED | OUTPATIENT
Start: 2019-02-08 | End: 2019-09-10 | Stop reason: SDUPTHER

## 2019-02-08 RX ORDER — BUPROPION HYDROCHLORIDE 300 MG/1
300 TABLET ORAL
Qty: 90 TAB | Refills: 1 | Status: SHIPPED | OUTPATIENT
Start: 2019-02-08 | End: 2019-08-13 | Stop reason: SDUPTHER

## 2019-02-08 RX ORDER — LEVOTHYROXINE SODIUM 137 UG/1
137 TABLET ORAL
Qty: 90 TAB | Refills: 1 | Status: SHIPPED | OUTPATIENT
Start: 2019-02-08 | End: 2019-08-13 | Stop reason: SDUPTHER

## 2019-02-08 RX ORDER — PRAVASTATIN SODIUM 10 MG/1
TABLET ORAL
Qty: 90 TAB | Refills: 1 | Status: SHIPPED | OUTPATIENT
Start: 2019-02-08 | End: 2019-09-10 | Stop reason: SDUPTHER

## 2019-04-11 NOTE — H&P (VIEW-ONLY)
04/11/19 1019   Final Note   Assessment Type Final Discharge Note   Anticipated Discharge Disposition Home-Health   What phone number can be called within the next 1-3 days to see how you are doing after discharge? [phone]   Discharge plans and expectations educations in teach back method with documentation complete? Yes      Surgery Consultation    Subjective:     Marcio Hill is a 48 y.o. female with a history of abdominal pain. She complains of right upper quadrant pain, typically associated  with fatty foods. She has had nausea and no vomiting and bloating. The symptoms began in October 2016 and then worsened this past week. The patient  has not had jaundice, acholic stools or dark urine and has not had a history of pancreatitis or hepatitis. Patient Active Problem List    Diagnosis Date Noted    Hypovitaminosis D 01/12/2017    Essential hypertension 11/09/2016    Hypothyroidism due to acquired atrophy of thyroid 09/08/2016    Hiatal hernia 03/28/2016    Fibromyalgia 10/21/2015    Gastroparesis 10/18/2012    GERD (gastroesophageal reflux disease) 10/18/2012    Hashimoto's thyroiditis 03/20/2012    Hyperlipidemia 03/20/2012     Past Medical History:   Diagnosis Date    Endometriosis     Essential hypertension 11/9/2016    Fibromyalgia 10/21/2015         Gastroparesis 10/18/2012    GERD (gastroesophageal reflux disease) 10/18/2012    Hashimoto's thyroiditis 3/20/2012    Hiatal hernia 3/28/2016    Hyperlipidemia 3/20/2012    Hypothyroidism 3/20/2012    Hypothyroidism due to acquired atrophy of thyroid 9/8/2016    Hypovitaminosis D 1/12/2017      Past Surgical History:   Procedure Laterality Date    HX GYN  7/2004    partial hysterectomy for endometriosis, has both ovaries.  HX KNEE ARTHROSCOPY  2/2015    Dr. Shazia Abreu    HX OVARIAN CYST REMOVAL  6/2015.     VAG HYST,RMV TUBE/OVARY  6-2015    Dr. Fredrick Segovia      Social History   Substance Use Topics    Smoking status: Never Smoker    Smokeless tobacco: Never Used    Alcohol use No      Family History   Problem Relation Age of Onset    Diabetes Mother     Thyroid Disease Mother     Arthritis-osteo Mother     Elevated Lipids Mother     Diabetes Maternal Aunt     Diabetes Maternal Grandfather       Current Outpatient Prescriptions   Medication Sig    dicyclomine (BENTYL) 20 mg tablet 20 mg.    docusate sodium (COLACE) 100 mg capsule 100 mg.  ibuprofen (MOTRIN) 800 mg tablet 800 mg.    ondansetron (ZOFRAN ODT) 4 mg disintegrating tablet 4 mg.  traMADol (ULTRAM) 50 mg tablet 50 mg.    HYDROcodone-acetaminophen (NORCO) 5-325 mg per tablet Take 1 Tab by Mouth Every 4 Hours As Needed for Pain.  docusate sodium (COLACE) 100 mg capsule Take  by Mouth.  esomeprazole (NEXIUM) 20 mg capsule 20 mg.    levothyroxine (SYNTHROID) 112 mcg tablet 112 mcg.  hydroCHLOROthiazide (HYDRODIURIL) 25 mg tablet     simvastatin (ZOCOR) 5 mg tablet     HYDROcodone-acetaminophen (NORCO) 5-325 mg per tablet Take 1 Tab by mouth two (2) times daily as needed for Pain. Max Daily Amount: 2 Tabs.  lisinopril-hydroCHLOROthiazide (PRINZIDE, ZESTORETIC) 20-25 mg per tablet Take 1 Tab by mouth daily.  simvastatin (ZOCOR) 10 mg tablet Take 1 Tab by mouth nightly.  levothyroxine (SYNTHROID) 112 mcg tablet Take 1 Tab by mouth Daily (before breakfast). Indications: hypothyroidism    cholecalciferol (VITAMIN D3) 1,000 unit cap Take  by mouth daily.  esomeprazole (NEXIUM) 40 mg capsule Take 1 Cap by mouth daily.  promethazine (PHENERGAN) 25 mg tablet Take 1 Tab by mouth every eight (8) hours as needed for Nausea. No current facility-administered medications for this visit.        No Known Allergies     Review of Systems:  Positive in BOLD    CONST: Fever, weight loss, fatigue or chills  GI: Nausea, vomiting, abdominal pain, change in bowel habits, hematochezia, melena, and GERD   INTEG: Dermatitis, abnormal moles  HEENT: Recent changes in vision, vertigo, epistaxis, dysphagia and hoarseness  CV: Chest pain, palpitations, HTN, edema and varicosities  RESP: Cough, shortness of breath, wheezing, hemoptysis, snoring and reactive airway disease  : Hematuria, dysuria, frequency, urgency, nocturia and stress urinary incontinence   MS: Weakness, joint pain and arthritis  ENDO: Diabetes, thyroid disease, polyuria, polydipsia, polyphagia, poor wound healing, heat intolerance, cold intolerance  LYMPH/HEME: Anemia, bruising and history of blood transfusions  NEURO: Dizziness, headache, fainting, seizures and stroke  PSYCH: Anxiety and depression      Objective:     Visit Vitals    BP 99/60 (BP 1 Location: Left arm, BP Patient Position: At rest)    Pulse 90    Temp 95.7 °F (35.4 °C) (Oral)    Resp 20    Ht 5' 5\" (1.651 m)    Wt 103 kg (227 lb)    BMI 37.77 kg/m2       Physical Exam:      GENERAL: alert, cooperative, no distress, appears stated age  EYE:conjunctivae and sclerae normal, pupils equal, round, reactive to light, extraocular movements intact without nystagmus  THROAT & NECK: no erythema or exudates noted and neck supple and symmetrical; no palpable masses  LUNG: clear to auscultation bilaterally  HEART: Regular rate and rhythm  ABDOMEN:abdomen is soft without significant tenderness, masses, organomegaly or guarding  EXTREMITIES:  extremities normal, atraumatic, no cyanosis or edema  SKIN: Normal.    Imaging and Lab Review:   Findings of ultrasound of the abdomen: gallstones and thickened gallbladder wall. No results found for this or any previous visit (from the past 24 hour(s)). images and reports reviewed    Assessment:   Chronic cholecystitis. Patient has significant symptoms and wishes to proceed with surgical intervention. Plan:   I explained the indications for laparoscopic cholecystectomy as well as the alternatives. I discussed the potential risks, benefits, and likely outcomes of this course of care, including but not limited to bleeding, wound infection, need for reoperation, bile leak, hernia formation, trocar injuries to small bowel and other structures. We discussed also the possible need for conversion to open procedure as well as a less than one percent risk of common bile duct injury.   We have also discussed the indications for cholangiogram including identification of the cystic duct, verification of normal ductal anatomy and evaluating choledocholithiasis. We have also discussed that she may require an ERCP if there are retained common bile duct stones which may necessitate and additional drain placement and hospital stay. She indicates that she understands the risks, accepts and wishes to proceed. She indicates that she understands the risks, accepts and wishes to proceed.  She understands the constipation and bloating will most likely not improve with cholecystectomy      Signed By: Lawanda Armando MD     March 10, 2017

## 2019-08-14 DIAGNOSIS — Z00.00 ANNUAL PHYSICAL EXAM: Primary | ICD-10-CM

## 2019-09-06 LAB
25(OH)D3+25(OH)D2 SERPL-MCNC: 31.9 NG/ML (ref 30–100)
ALBUMIN SERPL-MCNC: 4.5 G/DL (ref 3.5–5.5)
ALP SERPL-CCNC: 81 IU/L (ref 39–117)
ALT SERPL-CCNC: 12 IU/L (ref 0–32)
APPEARANCE UR: CLEAR
AST SERPL-CCNC: 14 IU/L (ref 0–40)
BASOPHILS # BLD AUTO: 0.1 X10E3/UL (ref 0–0.2)
BASOPHILS NFR BLD AUTO: 1 %
BILIRUB DIRECT SERPL-MCNC: 0.1 MG/DL (ref 0–0.4)
BILIRUB SERPL-MCNC: 0.4 MG/DL (ref 0–1.2)
BILIRUB UR QL STRIP: NEGATIVE
BUN SERPL-MCNC: 17 MG/DL (ref 6–24)
BUN/CREAT SERPL: 17 (ref 9–23)
CALCIUM SERPL-MCNC: 9.5 MG/DL (ref 8.7–10.2)
CHLORIDE SERPL-SCNC: 104 MMOL/L (ref 96–106)
CHOLEST SERPL-MCNC: 214 MG/DL (ref 100–199)
CO2 SERPL-SCNC: 25 MMOL/L (ref 20–29)
COLOR UR: YELLOW
CREAT SERPL-MCNC: 0.98 MG/DL (ref 0.57–1)
EOSINOPHIL # BLD AUTO: 0.2 X10E3/UL (ref 0–0.4)
EOSINOPHIL NFR BLD AUTO: 2 %
ERYTHROCYTE [DISTWIDTH] IN BLOOD BY AUTOMATED COUNT: 12.6 % (ref 12.3–15.4)
GLUCOSE SERPL-MCNC: 91 MG/DL (ref 65–99)
GLUCOSE UR QL: NEGATIVE
HCT VFR BLD AUTO: 42.1 % (ref 34–46.6)
HDLC SERPL-MCNC: 61 MG/DL
HGB BLD-MCNC: 13.7 G/DL (ref 11.1–15.9)
HGB UR QL STRIP: NEGATIVE
IMM GRANULOCYTES # BLD AUTO: 0 X10E3/UL (ref 0–0.1)
IMM GRANULOCYTES NFR BLD AUTO: 0 %
INTERPRETATION, 910389: NORMAL
KETONES UR QL STRIP: NEGATIVE
LDLC SERPL CALC-MCNC: 130 MG/DL (ref 0–99)
LEUKOCYTE ESTERASE UR QL STRIP: NEGATIVE
LYMPHOCYTES # BLD AUTO: 3.6 X10E3/UL (ref 0.7–3.1)
LYMPHOCYTES NFR BLD AUTO: 39 %
MCH RBC QN AUTO: 29.3 PG (ref 26.6–33)
MCHC RBC AUTO-ENTMCNC: 32.5 G/DL (ref 31.5–35.7)
MCV RBC AUTO: 90 FL (ref 79–97)
MICRO URNS: NORMAL
MONOCYTES # BLD AUTO: 0.5 X10E3/UL (ref 0.1–0.9)
MONOCYTES NFR BLD AUTO: 6 %
NEUTROPHILS # BLD AUTO: 4.8 X10E3/UL (ref 1.4–7)
NEUTROPHILS NFR BLD AUTO: 52 %
NITRITE UR QL STRIP: NEGATIVE
PH UR STRIP: 7 [PH] (ref 5–7.5)
PLATELET # BLD AUTO: 298 X10E3/UL (ref 150–450)
POTASSIUM SERPL-SCNC: 5 MMOL/L (ref 3.5–5.2)
PROT SERPL-MCNC: 7.2 G/DL (ref 6–8.5)
PROT UR QL STRIP: NEGATIVE
RBC # BLD AUTO: 4.67 X10E6/UL (ref 3.77–5.28)
SODIUM SERPL-SCNC: 146 MMOL/L (ref 134–144)
SP GR UR: 1.02 (ref 1–1.03)
T4 FREE SERPL-MCNC: 1.78 NG/DL (ref 0.82–1.77)
TRIGL SERPL-MCNC: 113 MG/DL (ref 0–149)
TSH SERPL DL<=0.005 MIU/L-ACNC: 2.22 UIU/ML (ref 0.45–4.5)
UROBILINOGEN UR STRIP-MCNC: 0.2 MG/DL (ref 0.2–1)
VLDLC SERPL CALC-MCNC: 23 MG/DL (ref 5–40)
WBC # BLD AUTO: 9.3 X10E3/UL (ref 3.4–10.8)

## 2019-09-13 ENCOUNTER — OFFICE VISIT (OUTPATIENT)
Dept: FAMILY MEDICINE CLINIC | Age: 49
End: 2019-09-13

## 2019-09-13 VITALS
SYSTOLIC BLOOD PRESSURE: 128 MMHG | TEMPERATURE: 96.3 F | BODY MASS INDEX: 37.45 KG/M2 | HEART RATE: 92 BPM | HEIGHT: 66 IN | OXYGEN SATURATION: 96 % | RESPIRATION RATE: 16 BRPM | DIASTOLIC BLOOD PRESSURE: 73 MMHG | WEIGHT: 233 LBS

## 2019-09-13 DIAGNOSIS — R06.83 SNORING: ICD-10-CM

## 2019-09-13 DIAGNOSIS — E78.00 PURE HYPERCHOLESTEROLEMIA: ICD-10-CM

## 2019-09-13 DIAGNOSIS — I10 ESSENTIAL HYPERTENSION: ICD-10-CM

## 2019-09-13 DIAGNOSIS — M25.50 MULTIPLE JOINT PAIN: ICD-10-CM

## 2019-09-13 DIAGNOSIS — Z00.00 ANNUAL PHYSICAL EXAM: Primary | ICD-10-CM

## 2019-09-13 DIAGNOSIS — Z12.39 BREAST CANCER SCREENING: ICD-10-CM

## 2019-09-13 DIAGNOSIS — Z23 ENCOUNTER FOR IMMUNIZATION: ICD-10-CM

## 2019-09-13 RX ORDER — LEVOTHYROXINE SODIUM 137 UG/1
TABLET ORAL
Qty: 90 TAB | Refills: 1 | Status: SHIPPED | OUTPATIENT
Start: 2019-09-13 | End: 2020-02-11

## 2019-09-13 RX ORDER — LISINOPRIL 20 MG/1
TABLET ORAL
Qty: 90 TAB | Refills: 1 | Status: SHIPPED | OUTPATIENT
Start: 2019-09-13 | End: 2020-03-23

## 2019-09-13 RX ORDER — BUPROPION HYDROCHLORIDE 300 MG/1
TABLET ORAL
Qty: 90 TAB | Refills: 1 | Status: SHIPPED | OUTPATIENT
Start: 2019-09-13

## 2019-09-13 NOTE — PROGRESS NOTES
SUBJECTIVE:   44 y.o. female for annual routine checkup. Has been feeling very tired, even when she gets a good night sleep. When I ask her about exercise, she says that she can not do this because \"it hurts all over, my joints and muscles\". Of note, she is on pravachol. Feels that the wellbutrin worked very well in the beginning, but not so much now. Current Outpatient Medications   Medication Sig Dispense Refill    lisinopril (PRINIVIL, ZESTRIL) 20 mg tablet TAKE 1 TABLET DAILY 90 Tab 1    levothyroxine (SYNTHROID) 137 mcg tablet TAKE 1 TABLET DAILY BEFORE BREAKFAST 90 Tab 1    buPROPion XL (WELLBUTRIN XL) 300 mg XL tablet TAKE 1 TABLET EVERY MORNING 90 Tab 1    pravastatin (PRAVACHOL) 10 mg tablet TAKE 1 TABLET BY MOUTH ONCE DAILY AT BEDTIME 90 Tab 1    estradiol (ESTRACE) 1 mg tablet estradiol 1 mg tablet   Take 1 tablet every day by oral route.  UBIDECARENONE/VITAMIN E MIXED (COQ10  PO) Take  by mouth daily.  ibuprofen (MOTRIN) 800 mg tablet 800 mg every six (6) hours as needed.  cholecalciferol (VITAMIN D3) 1,000 unit cap Take  by mouth daily. Past Medical History:   Diagnosis Date    Endometriosis     Episode of recurrent major depressive disorder ([facility] [address] 7/2/2018    Essential hypertension 11/9/2016    Fibromyalgia 10/21/2015         Frozen shoulder     Gastroparesis 10/18/2012    GERD (gastroesophageal reflux disease) 10/18/2012    Hashimoto's thyroiditis 3/20/2012    Hiatal hernia 3/28/2016    Hyperlipidemia 3/20/2012    Hypothyroidism 3/20/2012    Hypothyroidism due to acquired atrophy of thyroid 9/8/2016    Hypovitaminosis D 1/12/2017    Nausea & vomiting        Allergies: Codeine; Morphine; Oxycodone; and Simvastatin   No LMP recorded. Patient has had a hysterectomy. ROS:  Feeling well. No dyspnea or chest pain on exertion. No abdominal pain, change in bowel habits, black or bloody stools. No urinary tract symptoms.  GYN ROS: no breast pain or new or enlarging lumps on self exam. No neurological complaints. OBJECTIVE:   The patient appears well, alert, oriented x 3, in no distress. Visit Vitals  /73   Pulse 92   Temp 96.3 °F (35.7 °C) (Oral)   Resp 16   Ht 5' 6\" (1.676 m)   Wt 233 lb (105.7 kg)   SpO2 96%   BMI 37.61 kg/m²       General appearance: alert, cooperative, no distress, appears stated age  Head: Normocephalic, without obvious abnormality, atraumatic  Ears: normal TM's and external ear canals AU  Throat: Lips, mucosa, and tongue normal. Teeth and gums normal  Neck: supple, symmetrical, trachea midline, no adenopathy, thyroid: not enlarged, symmetric, no tenderness/mass/nodules, no carotid bruit and no JVD  Back: symmetric, no curvature. ROM normal. No CVA tenderness. Lungs: clear to auscultation bilaterally  Breasts: patient declines to have breast exam.  Heart: regular rate and rhythm, S1, S2 normal, no murmur, click, rub or gallop  Abdomen: soft, non-tender. Bowel sounds normal. No masses,  no organomegaly  Extremities: extremities normal, atraumatic, no cyanosis or edema  Pulses: 2+ and symmetric  Skin: Skin color, texture, turgor normal. No rashes or lesions  Neurological is normal, no focal findings.            Lab Results   Component Value Date/Time    WBC 9.3 09/05/2019 08:38 AM    Hemoglobin, POC 12.2 03/27/2017 09:20 AM    HGB 13.7 09/05/2019 08:38 AM    Hematocrit, POC 36 03/27/2017 09:20 AM    HCT 42.1 09/05/2019 08:38 AM    PLATELET 599 12/00/0473 08:38 AM    MCV 90 09/05/2019 08:38 AM         Lab Results   Component Value Date/Time    Sodium 146 (H) 09/05/2019 08:38 AM    Potassium 5.0 09/05/2019 08:38 AM    Chloride 104 09/05/2019 08:38 AM    CO2 25 09/05/2019 08:38 AM    Glucose 91 09/05/2019 08:38 AM    BUN 17 09/05/2019 08:38 AM    Creatinine 0.98 09/05/2019 08:38 AM    BUN/Creatinine ratio 17 09/05/2019 08:38 AM    GFR est AA 77 09/05/2019 08:38 AM    GFR est non-AA 67 09/05/2019 08:38 AM    Calcium 9.5 09/05/2019 08:38 AM         Lab Results   Component Value Date/Time    ALT (SGPT) 12 09/05/2019 08:38 AM    AST (SGOT) 14 09/05/2019 08:38 AM    Alk. phosphatase 81 09/05/2019 08:38 AM    Bilirubin, direct 0.10 09/05/2019 08:38 AM    Bilirubin, total 0.4 09/05/2019 08:38 AM         Lab Results   Component Value Date/Time    Cholesterol, total 214 (H) 09/05/2019 08:38 AM    HDL Cholesterol 61 09/05/2019 08:38 AM    LDL, calculated 130 (H) 09/05/2019 08:38 AM    VLDL, calculated 23 09/05/2019 08:38 AM    Triglyceride 113 09/05/2019 08:38 AM         Lab Results   Component Value Date/Time    TSH 2.220 09/05/2019 08:38 AM    T4, Free 1.78 (H) 09/05/2019 08:38 AM         Lab Results   Component Value Date/Time    VITAMIN D, 25-HYDROXY 31.9 09/05/2019 08:38 AM             ASSESSMENT:   Diagnoses and all orders for this visit:    1. Annual physical exam    2. Encounter for immunization  -     INFLUENZA VIRUS VAC QUAD,SPLIT,PRESV FREE SYRINGE IM  -     WA IMMUNIZ ADMIN,1 SINGLE/COMB VAC/TOXOID    3. Breast cancer screening  -     LISA MAMMO BI SCREENING INCL CAD; Future    4. Multiple joint pain  -     REFERRAL TO RHEUMATOLOGY    5. Pure hypercholesterolemia    6. Snoring  -     SLEEP MEDICINE REFERRAL    7. Essential hypertension  -     lisinopril (PRINIVIL, ZESTRIL) 20 mg tablet; TAKE 1 TABLET DAILY    Other orders  -     levothyroxine (SYNTHROID) 137 mcg tablet; TAKE 1 TABLET DAILY BEFORE BREAKFAST  -     buPROPion XL (WELLBUTRIN XL) 300 mg XL tablet; TAKE 1 TABLET EVERY MORNING          PLAN:   Mammogram: past due    Colonoscopy: has appt to have this done with an EGD by Dr. Ailyn Oro. Will hold pravachol for 3 weeks since she has had musculoskeletal pain. Will also give her referral to rheumatology to evaluate further if statin is not the cause. Will evaluate her chr fatigue with a sleep study.     She is not sure that the wellbutrin is working so well but we spoke about the fact that if she disla snot feel physically well, it can impact her mood. So, will not make any changes just yet. F/u 3 months. The plan was discussed with the patient. The patient verbalized understanding and is in agreement with the plan. All medication potential side effects were discussed with the patient.

## 2019-09-13 NOTE — PROGRESS NOTES
Thiago Cook is a 44 y.o. female (: 1970) presenting to address:    Chief Complaint   Patient presents with    Complete Physical       Vitals:    19 1002   BP: 128/73   Pulse: 92   Resp: 16   Temp: 96.3 °F (35.7 °C)   TempSrc: Oral   SpO2: 96%   Weight: 233 lb (105.7 kg)   Height: 5' 6\" (1.676 m)   PainSc:   8   PainLoc: Generalized       Hearing/Vision:      Visual Acuity Screening    Right eye Left eye Both eyes   Without correction: 20/20 20/25 20/20   With correction:          Learning Assessment:     Learning Assessment 10/21/2015   PRIMARY LEARNER Patient   HIGHEST LEVEL OF EDUCATION - PRIMARY LEARNER  SOME COLLEGE   BARRIERS PRIMARY LEARNER NONE   CO-LEARNER CAREGIVER No   PRIMARY LANGUAGE ENGLISH   LEARNER PREFERENCE PRIMARY LISTENING   ANSWERED BY self   RELATIONSHIP SELF     Depression Screening:     3 most recent PHQ Screens 2018   Little interest or pleasure in doing things More than half the days   Feeling down, depressed, irritable, or hopeless Nearly every day   Total Score PHQ 2 5   Trouble falling or staying asleep, or sleeping too much More than half the days   Feeling tired or having little energy More than half the days   Poor appetite, weight loss, or overeating Not at all   Feeling bad about yourself - or that you are a failure or have let yourself or your family down Several days   Trouble concentrating on things such as school, work, reading, or watching TV Not at all   Moving or speaking so slowly that other people could have noticed; or the opposite being so fidgety that others notice Not at all   Thoughts of being better off dead, or hurting yourself in some way Not at all   How difficult have these problems made it for you to do your work, take care of your home and get along with others Very difficult     Fall Risk Assessment:     Fall Risk Assessment, last 12 mths 2019   Able to walk? Yes   Fall in past 12 months?  No     Abuse Screening:     Abuse Screening Questionnaire 9/13/2019   Do you ever feel afraid of your partner? N   Are you in a relationship with someone who physically or mentally threatens you? N   Is it safe for you to go home? Y     Coordination of Care Questionaire:   1. Have you been to the ER, urgent care clinic since your last visit? Hospitalized since your last visit? NO    2. Have you seen or consulted any other health care providers outside of the [address] [place] [address] since your last visit? Include any pap smears or colon screening. NO    Advanced Directive:   1. Do you have an Advanced Directive? NO    2. Would you like information on Advanced Directives? NO    Flu shot Immunization/s administered 9/13/2019 by Claudene Fee, LPN with guardian's consent. Patient tolerated procedure well. No reactions noted.

## 2019-09-13 NOTE — PATIENT INSTRUCTIONS
Vaccine Information Statement    Influenza (Flu) Vaccine (Live, Intranasal): What You Need to Know    Many Vaccine Information Statements are available in Thai and other languages. See www.immunize.org/vis  Hojas de información sobre vacunas están disponibles en español y en muchos otros idiomas. Visite www.immunize.org/vis    1. Why get vaccinated? Influenza vaccine can prevent influenza (flu). Flu is a contagious disease that spreads around the [facility] every year, usually between October and May. Anyone can get the flu, but it is more dangerous for some people. Infants and young children, people 72years of age and older, pregnant women, and people with certain health conditions or a weakened immune system are at greatest risk of flu complications. Pneumonia, bronchitis, sinus infections and ear infections are examples of flu-related complications. If you have a medical condition, such as heart disease, cancer or diabetes, flu can make it worse. Flu can cause fever and chills, sore throat, muscle aches, fatigue, cough, headache, and runny or stuffy nose. Some people may have vomiting and diarrhea, though this is more common in children than adults. Each year thousands of people in the [facility] die from flu, and many more are hospitalized. Flu vaccine prevents millions of illnesses and flu-related visits to the doctor each year. 2. Live, attenuated influenza vaccine     CDC recommends everyone 10months of age and older get vaccinated every flu season. Children 6 months through 6years of age may need 2 doses during a single flu season. Everyone else needs only 1 dose each flu season. Live, attenuated influenza vaccine (called LAIV) is a nasal spray vaccine that may be given to non-pregnant people 2 through 52years of age. It takes about 2 weeks for protection to develop after vaccination. There are many flu viruses, and they are always changing.  Each year a new flu vaccine is made to protect against three or four viruses that are likely to cause disease in the upcoming flu season. Even when the vaccine doesnt exactly match these viruses, it may still provide some protection. Influenza vaccine does not cause flu. Influenza vaccine may be given at the same time as other vaccines. 3. Talk with your health care provider    Tell your vaccine provider if the person getting the vaccine:   Is younger than 2 years or older than 52years of age.  Is pregnant.  Has had an allergic reaction after a previous dose of influenza vaccine, or has any severe, life-threatening allergies.  Is a child or adolescent 2 through 16years of age who is receiving aspirin or aspirin-containing products.  Has a weakened immune system.  Is a child 3through 3years old who has asthma or a history of wheezing in the past 12 months.  Has taken influenza antiviral medication in the previous 48 hours.  Cares for severely immunocompromised persons who require a protected environment.  Is 5 years or older and has asthma.  Has other underlying medical conditions that can put people at higher risk of serious flu complications (such as lung disease, heart disease, kidney disease, kidney or liver disorders, neurologic or neuromuscular or metabolic disorders).  Has had Guillain-Barré Syndrome within 6 weeks after a previous dose of influenza vaccine. In some cases, your health care provider may decide to postpone influenza vaccination to a future visit. For some patients, a different type of influenza vaccine (inactivated or recombinant influenza vaccine) might be more appropriate than live, attenuated influenza vaccine. People with minor illnesses, such as a cold, may be vaccinated. People who are moderately or severely ill should usually wait until they recover before getting influenza vaccine. Your health care provider can give you more information.     4. Risks of a vaccine reaction     Runny nose or nasal congestion, wheezing and headache can happen after LAIV.  Vomiting, muscle aches, fever, sore throat and cough are other possible side effects. If these problems occur, they usually begin soon after vaccination and are mild and short-lived. As with any medicine, there is a very remote chance of a vaccine causing a severe allergic reaction, other serious injury, or death. 5. What if there is a serious problem? An allergic reaction could occur after the vaccinated person leaves the clinic. If you see signs of a severe allergic reaction (hives, swelling of the face and throat, difficulty breathing, a fast heartbeat, dizziness, or weakness), call 9-1-1 and get the person to the nearest hospital.    For other signs that concern you, call your health care provider. Adverse reactions should be reported to the Vaccine Adverse Event Reporting System (VAERS). Your health care provider will usually file this report, or you can do it yourself. Visit the VAERS website at www.vaers. hhs.gov or call 3-603.178.7106. VAERS is only for reporting reactions, and VAERS staff do not give medical advice. 6. The National Vaccine Injury Compensation Program    The [facility] Vaccine Injury Compensation Program (VICP) is a federal program that was created to compensate people who may have been injured by certain vaccines. Visit the VICP website at www.hrsa.gov/vaccinecompensation or call 8-715.765.2482 to learn about the program and about filing a claim. There is a time limit to file a claim for compensation. 7. How can I learn more?  Ask your health care provider.  Call your local or state health department.  Contact the Centers for Disease Control and Prevention (CDC):  - Call 9-969.737.8207 (1-800-CDC-INFO) or  - Visit CDCs influenza website at www.cdc.gov/flu    Vaccine Information Statement (Interim)  Live Attenuated Influenza Vaccine   8/15/2019  42 SWAPNIL Roblero [id number]   Department of Health and Human Services  Centers for Disease Control and Prevention    Office Use Only       Well Visit, Women 48 to 72: Care Instructions  Your Care Instructions    Physical exams can help you stay healthy. Your doctor has checked your overall health and may have suggested ways to take good care of yourself. He or she also may have recommended tests. At home, you can help prevent illness with healthy eating, regular exercise, and other steps. Follow-up care is a key part of your treatment and safety. Be sure to make and go to all appointments, and call your doctor if you are having problems. It's also a good idea to know your test results and keep a list of the medicines you take. How can you care for yourself at home? · Reach and stay at a healthy weight. This will lower your risk for many problems, such as obesity, diabetes, heart disease, and high blood pressure. · Get at least 30 minutes of exercise on most days of the week. Walking is a good choice. You also may want to do other activities, such as running, swimming, cycling, or playing tennis or team sports. · Do not smoke. Smoking can make health problems worse. If you need help quitting, talk to your doctor about stop-smoking programs and medicines. These can increase your chances of quitting for good. · Protect your skin from too much sun. When you're outdoors from 10 a.m. to 4 p.m., stay in the shade or cover up with clothing and a hat with a wide brim. Wear sunglasses that block UV rays. Even when it's cloudy, put broad-spectrum sunscreen (SPF 30 or higher) on any exposed skin. · See a dentist one or two times a year for checkups and to have your teeth cleaned. · Wear a seat belt in the car. Follow your doctor's advice about when to have certain tests. These tests can spot problems early. · Cholesterol.  Your doctor will tell you how often to have this done based on your age, family history, or other things that can increase your risk for heart attack and stroke. · Blood pressure. Have your blood pressure checked during a routine doctor visit. Your doctor will tell you how often to check your blood pressure based on your age, your blood pressure results, and other factors. · Mammogram. Ask your doctor how often you should have a mammogram, which is an X-ray of your breasts. A mammogram can spot breast cancer before it can be felt and when it is easiest to treat. · Pap test and pelvic exam. Ask your doctor how often you should have a Pap test. You may not need to have a Pap test as often as you used to. · Vision. Have your eyes checked every year or two or as often as your doctor suggests. Some experts recommend that you have yearly exams for glaucoma and other age-related eye problems starting at age 48. · Hearing. Tell your doctor if you notice any change in your hearing. You can have tests to find out how well you hear. · Diabetes. Ask your doctor whether you should have tests for diabetes. · Colorectal cancer. Your risk for colorectal cancer gets higher as you get older. Some experts say that adults should start regular screening at age 48 and stop at age 76. Others say to start before age 48 or continue after age 76. Talk with your doctor about your risk and when to start and stop screening. · Thyroid disease. Talk to your doctor about whether to have your thyroid checked as part of a regular physical exam. Women have an increased chance of a thyroid problem. · Osteoporosis. You should begin tests for bone density at age 72. If you are younger than 72, ask your doctor whether you have factors that may increase your risk for this disease. You may want to have this test before age 72. · Heart attack and stroke risk. At least every 4 to 6 years, you should have your risk for heart attack and stroke assessed.  Your doctor uses factors such as your age, blood pressure, cholesterol, and whether you smoke or have diabetes to show what your risk for a heart attack or stroke is over the next 10 years. When should you call for help? Watch closely for changes in your health, and be sure to contact your doctor if you have any problems or symptoms that concern you. Where can you learn more? Go to http://yoel-osvaldo.info/. Enter H557 in the search box to learn more about \"Well Visit, Women 50 to 72: Care Instructions. \"  Current as of: December 13, 2018  Content Version: 12.1  © 6996-7266 Healthwise, Incorporated. Care instructions adapted under license by [employer] (which disclaims liability or warranty for this information). If you have questions about a medical condition or this instruction, always ask your healthcare professional. [address] any warranty or liability for your use of this information.

## 2019-09-18 DIAGNOSIS — E78.00 PURE HYPERCHOLESTEROLEMIA: Primary | ICD-10-CM

## 2021-02-04 NOTE — PROGRESS NOTES
Preoperative Evaluation    Date of Exam: 2017    Maximus Knowles is a 50 y.o. female (:1970) who presents for preoperative evaluation. Pt will be having RT shoulder arthroscopy, possibly rotator cuff repair, sub acromial decompression, [place] with Dr. Melly Mak at [facility] on 17. HTN: controlled. Latex Allergy: no    Problem List:     Patient Active Problem List    Diagnosis Date Noted    Hypovitaminosis D 2017    Essential hypertension 2016    Hypothyroidism due to acquired atrophy of thyroid 2016    Hiatal hernia 2016    Fibromyalgia 10/21/2015    Gastroparesis 10/18/2012    GERD (gastroesophageal reflux disease) 10/18/2012    Hashimoto's thyroiditis 2012    Hyperlipidemia 2012     Allergies: Allergies   Allergen Reactions    Codeine Hives     Hydrocodone      Morphine Myalgia    Simvastatin Other (comments)     Severe muscle cramps      Medications:     Current Outpatient Prescriptions   Medication Sig    lisinopril (PRINIVIL, ZESTRIL) 20 mg tablet Take 1 Tab by mouth daily.  levothyroxine (SYNTHROID) 112 mcg tablet Take 1 Tab by mouth Daily (before breakfast). Indications: hypothyroidism    pravastatin (PRAVACHOL) 10 mg tablet Take 1 Tab by mouth nightly.  UBIDECARENONE/VITAMIN E MIXED (COQ10  PO) Take  by mouth daily.  ibuprofen (MOTRIN) 800 mg tablet 800 mg.  cholecalciferol (VITAMIN D3) 1,000 unit cap Take  by mouth daily. No current facility-administered medications for this visit. Surgical History:     Past Surgical History:   Procedure Laterality Date    HX CHOLECYSTECTOMY      17    HX GYN  2004    partial hysterectomy for endometriosis, has both ovaries.  HX KNEE ARTHROSCOPY  2015    Dr. Miguel Brown    HX LAP CHOLECYSTECTOMY  2017    HX OVARIAN CYST REMOVAL  2015.     VAG HYST,RMV TUBE/OVARY  [id number]    Dr. Anya Salinas     Social History:     Social History [de-identified] : Social History    Marital status:      Spouse name: N/A    Number of children: N/A    Years of education: N/A     Social History Main Topics    Smoking status: Never Smoker    Smokeless tobacco: Never Used    Alcohol use No    Drug use: No    Sexual activity: Not Currently     Other Topics Concern    None     Social History Narrative       Anesthesia Complications: None  History of abnormal bleeding : None  History of Blood Transfusions: no      Objective:     Review of Systems - Negative     Physical Examination: General appearance - alert, well appearing, and in no distress  Chest - clear to auscultation, no wheezes, rales or rhonchi, symmetric air entry  Heart - normal rate, regular rhythm, normal S1, S2, no murmurs, rubs, clicks or gallops        DIAGNOSTICS:   1. EKG: EKG FINDINGS - unchanged from previous tracings  2. CXR: results pending. 3. Labs: pending    IMPRESSION:   Low risk for planned surgery  No contraindications to planned surgery. Olman Baldwin MD   [address] [facility], [address] [place] [address]  [phone] (office #)  [phone] (fax #)  8/29/2017      Due to lower BP readings in recent times, will decrease to just Lisinopril 20 mg daily. Pt to f/u in 4-6 weeks to evaluate. She will get a BP monitor of her own and check at home. Will bring readings to visit. Addendum 8/31/17: labs and CXR were reviewed and are within normal limits. Pt can proceed with surgery.

## 2023-01-21 NOTE — LETTER
NOTIFICATION RETURN TO WORK / SCHOOL 
 
8/29/2017 2:53 PM 
 
Ms. Екатерина Mejia Jose Alejandro 
[address] [address] To Whom It May Concern: 
 
Gilles Hernandez is currently under the care of [address] [place] [address]. She will return to work/school on: 8/30/17 If there are questions or concerns please have the patient contact our office. Sincerely, Humera Patel MD 
 
                                
 
 Distracted or reassured by voice or touch

## 2023-01-31 ENCOUNTER — APPOINTMENT (OUTPATIENT)
Dept: GENERAL RADIOLOGY | Age: 53
End: 2023-01-31
Attending: PHYSICIAN ASSISTANT

## 2023-01-31 ENCOUNTER — HOSPITAL ENCOUNTER (EMERGENCY)
Age: 53
Discharge: HOME | End: 2023-01-31
Attending: EMERGENCY MEDICINE

## 2023-01-31 VITALS
SYSTOLIC BLOOD PRESSURE: 117 MMHG | RESPIRATION RATE: 18 BRPM | WEIGHT: 229 LBS | HEART RATE: 87 BPM | BODY MASS INDEX: 38.15 KG/M2 | OXYGEN SATURATION: 99 % | DIASTOLIC BLOOD PRESSURE: 81 MMHG | TEMPERATURE: 97.9 F | HEIGHT: 65 IN

## 2023-01-31 DIAGNOSIS — S89.92XA LEFT KNEE INJURY, INITIAL ENCOUNTER: Primary | ICD-10-CM

## 2023-01-31 PROCEDURE — 99283 EMERGENCY DEPT VISIT LOW MDM: CPT

## 2023-01-31 PROCEDURE — 73564 X-RAY EXAM KNEE 4 OR MORE: CPT

## 2023-01-31 RX ORDER — KETOROLAC TROMETHAMINE 10 MG/1
10 TABLET, FILM COATED ORAL
Qty: 20 TABLET | Refills: 0 | Status: SHIPPED | OUTPATIENT
Start: 2023-01-31

## 2023-01-31 RX ORDER — ACETAMINOPHEN 325 MG/1
650 TABLET ORAL
Qty: 20 TABLET | Refills: 0 | Status: SHIPPED | OUTPATIENT
Start: 2023-01-31

## 2023-01-31 NOTE — Clinical Note
[address] [facility] EMERGENCY DEPT  [id number] [address] [phone]    Work/School Note    Date: 1/31/2023    To Whom It May concern:    Truett Bloch was seen and treated today in the emergency room by the following provider(s):  Attending Provider: Jas Ryan MD  Physician Assistant: ZEV Gutiérrez. Truett Bloch is excused from work/school on 1/31/2023 through 2/2/2023. She is medically clear to return to work/school on 2/3/2023.          Sincerely,          ZEV Cho

## 2023-01-31 NOTE — ED PROVIDER NOTES
EMERGENCY DEPARTMENT HISTORY AND PHYSICAL EXAM    Date: 1/31/2023  Patient Name: Jody Blount    History of Presenting Illness     Chief Complaint   Patient presents with    Knee Injury         History Provided By: Patient     Additional History (Context): Nhung Heck is a 45 y.o. female with a history of hypertension who presents today for an injury that occurred this morning. Patient reports she was wearing boots when she slipped and as a result fell onto her knee. Patient denies any prior injuries or surgeries to this knee. Reports she then went to work all day and walked on it which she thinks made it worse. PCP: None    Current Outpatient Medications   Medication Sig Dispense Refill    ketorolac (TORADOL) 10 mg tablet Take 1 Tablet by mouth every six (6) hours as needed for Pain. 20 Tablet 0    acetaminophen (TYLENOL) 325 mg tablet Take 2 Tablets by mouth every four (4) hours as needed for Pain. 20 Tablet 0    lisinopriL (PRINIVIL, ZESTRIL) 20 mg tablet Take 1 tablet by mouth once daily 90 Tab 1    levothyroxine (SYNTHROID) 137 mcg tablet TAKE 1 TABLET BY MOUTH ONCE DAILY BEFORE BREAKFAST 90 Tab 0    buPROPion XL (WELLBUTRIN XL) 300 mg XL tablet TAKE 1 TABLET EVERY MORNING 90 Tab 1    estradiol (ESTRACE) 1 mg tablet estradiol 1 mg tablet   Take 1 tablet every day by oral route. UBIDECARENONE/VITAMIN E MIXED (COQ10  PO) Take  by mouth daily. ibuprofen (MOTRIN) 800 mg tablet 800 mg every six (6) hours as needed. cholecalciferol (VITAMIN D3) 1,000 unit cap Take  by mouth daily.          Past History     Past Medical History:  Past Medical History:   Diagnosis Date    Endometriosis     Episode of recurrent major depressive disorder ([facility] [address] 7/2/2018    Essential hypertension 11/9/2016    Fibromyalgia 10/21/2015         Frozen shoulder     Gastroparesis 10/18/2012    GERD (gastroesophageal reflux disease) 10/18/2012    Hashimoto's thyroiditis 3/20/2012    Hiatal hernia 3/28/2016 Hyperlipidemia 3/20/2012    Hypothyroidism 3/20/2012    Hypothyroidism due to acquired atrophy of thyroid 9/8/2016    Hypovitaminosis D 1/12/2017    Nausea & vomiting        Past Surgical History:  Past Surgical History:   Procedure Laterality Date    HX CHOLECYSTECTOMY      03/27/17    HX GYN  7/2004    partial hysterectomy for endometriosis, has L ovary. HX KNEE ARTHROSCOPY  2/2015    Dr. Zulma Guadalupe    HX LAP CHOLECYSTECTOMY  03/27/2017    HX OTHER SURGICAL Right 09/11/2017    Shoulder surgery rotator cuff and bicep repair    HX OVARIAN CYST REMOVAL  6/2015. [place] VAG HYST 250 GM/< W/RMVL TUBE&/OVARY  6-2015    Dr. Phliomena Estevez       Family History:  Family History   Problem Relation Age of Onset    Diabetes Mother     Thyroid Disease Mother     OSTEOARTHRITIS Mother     Elevated Lipids Mother     Diabetes Maternal Aunt     Diabetes Maternal Grandfather     Bipolar Disorder Sister        Social History:  Social History     Tobacco Use    Smoking status: Never    Smokeless tobacco: Never   Substance Use Topics    Alcohol use: No    Drug use: No       Allergies: Allergies   Allergen Reactions    Codeine Hives     Hydrocodone      Morphine Myalgia and Other (comments)     Headache    Oxycodone Rash    Simvastatin Other (comments)     Leg cramps  Severe muscle cramps         Review of Systems   Review of Systems   Constitutional:  Negative for chills and fever. HENT:  Negative for congestion, rhinorrhea and sore throat. Respiratory:  Negative for cough and shortness of breath. Cardiovascular:  Negative for chest pain. Gastrointestinal:  Negative for abdominal pain, blood in stool, constipation, diarrhea, nausea and vomiting. Genitourinary:  Negative for dysuria, frequency and hematuria. Musculoskeletal:  Positive for arthralgias. Negative for back pain and myalgias. Skin:  Negative for rash and wound. Neurological:  Negative for dizziness and headaches.    All other systems reviewed and are negative. All Other Systems Negative  Physical Exam     Vitals:    01/31/23 1645   BP: 117/81   Pulse: 87   Resp: 18   Temp: 97.9 °F (36.6 °C)   SpO2: 99%   Weight: 103.9 kg (229 lb)   Height: 5' 5\" (1.651 m)     Physical Exam  Vitals and nursing note reviewed. Constitutional:       General: She is not in acute distress. Appearance: She is well-developed. She is not diaphoretic. HENT:      Head: Normocephalic and atraumatic. Eyes:      Conjunctiva/sclera: Conjunctivae normal.   Cardiovascular:      Rate and Rhythm: Normal rate and regular rhythm. Heart sounds: Normal heart sounds. Pulmonary:      Effort: Pulmonary effort is normal. No respiratory distress. Breath sounds: Normal breath sounds. Chest:      Chest wall: No tenderness. Abdominal:      General: Bowel sounds are normal. There is no distension. Palpations: Abdomen is soft. Tenderness: There is no abdominal tenderness. There is no guarding or rebound. Musculoskeletal:         General: No deformity. Cervical back: Normal range of motion and neck supple. Left knee: Swelling and bony tenderness present. No erythema or ecchymosis. Decreased range of motion. Tenderness present. Normal alignment. Comments: 2 overlying abrasions noted    Skin:     General: Skin is warm and dry. Neurological:      Mental Status: She is alert and oriented to person, place, and time. Deep Tendon Reflexes: Reflexes are normal and symmetric. Diagnostic Study Results     Labs -   No results found for this or any previous visit (from the past 12 hour(s)). Radiologic Studies -   XR KNEE LT MIN 4 V    (Results Pending)     CT Results  (Last 48 hours)      None          CXR Results  (Last 48 hours)      None              Medical Decision Making   I am the first provider for this patient.     I reviewed the vital signs, available nursing notes, past medical history, past surgical history, family history and social history. Vital Signs-Reviewed the patient's vital signs. Records Reviewed: Nursing Notes and Old Medical Records     Procedures: None   Procedures    Provider Notes (Medical Decision Making):     Differential Diagnosis: Knee strain, OA, RA, gout, Osgood Schlatters, bursitis, bakers cyst, ACL tear/strain, PCL tear/strain, MCL tear/strain, LCL tear/strain, medial meniscus tear, lateral meniscus tear, patellar dislocation    Plan: We will order x-rays    6:48 PM  Discussed imaging results with patient. Did not see any acute fracture or dislocation. Have encouraged RICE care for home and will discharge home with Toradol and Tylenol. Will give work note. Have advised further follow-up and evaluation with orthopedics. Patient states understanding agrees. Will discharge home. MED RECONCILIATION:  No current facility-administered medications for this encounter. Current Outpatient Medications   Medication Sig    ketorolac (TORADOL) 10 mg tablet Take 1 Tablet by mouth every six (6) hours as needed for Pain. acetaminophen (TYLENOL) 325 mg tablet Take 2 Tablets by mouth every four (4) hours as needed for Pain. lisinopriL (PRINIVIL, ZESTRIL) 20 mg tablet Take 1 tablet by mouth once daily    levothyroxine (SYNTHROID) 137 mcg tablet TAKE 1 TABLET BY MOUTH ONCE DAILY BEFORE BREAKFAST    buPROPion XL (WELLBUTRIN XL) 300 mg XL tablet TAKE 1 TABLET EVERY MORNING    estradiol (ESTRACE) 1 mg tablet estradiol 1 mg tablet   Take 1 tablet every day by oral route. UBIDECARENONE/VITAMIN E MIXED (COQ10  PO) Take  by mouth daily. ibuprofen (MOTRIN) 800 mg tablet 800 mg every six (6) hours as needed. cholecalciferol (VITAMIN D3) 1,000 unit cap Take  by mouth daily. Disposition:  Home     DISCHARGE NOTE:   Pt has been reexamined. Patient has no new complaints, changes, or physical findings. Care plan outlined and precautions discussed. Results of workup were reviewed with the patient.  All medications were reviewed with the patient. All of pt's questions and concerns were addressed. Patient was instructed and agrees to follow up with PCP/Ortho as well as to return to the ED upon further deterioration. Patient is ready to go home. Follow-up Information       Follow up With Specialties Details [facility] EMERGENCY DEPT Emergency Medicine  As needed [address] [facility]  [phone]    [facility] - [facility] Schedule an appointment as soon as possible for a visit   [address], [address] [facility]  [phone]            Current Discharge Medication List        START taking these medications    Details   ketorolac (TORADOL) 10 mg tablet Take 1 Tablet by mouth every six (6) hours as needed for Pain. Qty: 20 Tablet, Refills: 0  Start date: 1/31/2023      acetaminophen (TYLENOL) 325 mg tablet Take 2 Tablets by mouth every four (4) hours as needed for Pain. Qty: 20 Tablet, Refills: 0  Start date: 1/31/2023                 Diagnosis     Clinical Impression:   1. Left knee injury, initial encounter          \"Please note that this dictation was completed with [employer], the computer voice recognition software. Quite often unanticipated grammatical, syntax, homophones, and other interpretive errors are inadvertently transcribed by the computer software. Please disregard these errors. Please excuse any errors that have escaped final proofreading. \"

## 2023-01-31 NOTE — Clinical Note
[address] [facility] EMERGENCY DEPT  [address] [address] [phone]    Work/School Note    Date: 1/31/2023    To Whom It May concern:    Joe Mcallister was seen and treated today in the emergency room by the following provider(s):  Attending Provider: David Allen MD  Physician Assistant: ZEV Marinelli. Joe Mcallister is excused from work/school on 1/31/2023 through 2/2/2023. She is medically clear to return to work/school on 2/3/2023.          Sincerely,          ZEV Frankel

## (undated) DEVICE — BLADELESS OPTICAL TROCAR WITH FIXATION CANNULA: Brand: VERSAONE

## (undated) DEVICE — SPECIMEN RETRIEVAL POUCH: Brand: ENDO CATCH GOLD

## (undated) DEVICE — SOLUTION LACTATED RINGERS INJECTION USP

## (undated) DEVICE — UNIVERSAL FIXATION CANNULA: Brand: VERSAONE

## (undated) DEVICE — SOLUTION SCRB 4OZ 4% CHG CLN BASE FOR PT SKIN ANTISEPSIS

## (undated) DEVICE — KENDALL SCD EXPRESS SLEEVES, KNEE LENGTH, MEDIUM: Brand: KENDALL SCD

## (undated) DEVICE — LIGHT HANDLE: Brand: DEVON

## (undated) DEVICE — DERMABOND SKIN ADH 0.7ML -- DERMABOND ADVANCED 12/BX

## (undated) DEVICE — BLADELESS OPTICAL TROCAR WITH FIXATION CANNULA: Brand: VERSAPORT

## (undated) DEVICE — SYRINGE MED 20ML STD CLR PLAS LUERLOCK TIP N CTRL DISP

## (undated) DEVICE — (D)SOL INJ CONRAY 30 50ML -- DISC NO USAGE

## (undated) DEVICE — CATHETER IV 14GA L5.25IN PERIPH ORNG FEP POLYMER 3 BVL

## (undated) DEVICE — INTENDED FOR TISSUE SEPARATION, AND OTHER PROCEDURES THAT REQUIRE A SHARP SURGICAL BLADE TO PUNCTURE OR CUT.: Brand: BARD-PARKER ® CARBON RIB-BACK BLADES

## (undated) DEVICE — SHEARS: Brand: ENDO SHEARS

## (undated) DEVICE — VISUALIZATION SYSTEM: Brand: CLEARIFY

## (undated) DEVICE — SUTURE MCRYL SZ 4-0 L27IN ABSRB UD L24MM PS-1 3/8 CIR PRIM Y935H

## (undated) DEVICE — PACK PROCEDURE SURG LAPAROSCOPY 17X7 MM BRTRC PRIMUS

## (undated) DEVICE — SOLUTION IV 1000ML 0.9% SOD CHL

## (undated) DEVICE — DRAPE XR C ARM MOB SURG 44X72 --